# Patient Record
Sex: MALE | Race: WHITE | Employment: OTHER | URBAN - METROPOLITAN AREA
[De-identification: names, ages, dates, MRNs, and addresses within clinical notes are randomized per-mention and may not be internally consistent; named-entity substitution may affect disease eponyms.]

---

## 2017-03-10 ENCOUNTER — TELEPHONE (OUTPATIENT)
Dept: CARDIOLOGY CLINIC | Age: 67
End: 2017-03-10

## 2017-03-10 NOTE — TELEPHONE ENCOUNTER
Patient called regarding yearly Angio. He would like for someone to call him at [phone].  Thank you

## 2017-09-06 ENCOUNTER — CLINICAL SUPPORT (OUTPATIENT)
Dept: CARDIOLOGY CLINIC | Age: 67
End: 2017-09-06

## 2017-09-06 DIAGNOSIS — I71.40 ABDOMINAL AORTIC ANEURYSM (AAA) WITHOUT RUPTURE: Primary | ICD-10-CM

## 2017-09-06 NOTE — PROCEDURES
[facility]  *** FINAL REPORT ***    Name: Jean Claude Myers  MRN: [id number]       Outpatient  : 12 May 1950  HIS Order #: [id number]  [address] [facility] Visit #: [id number]  Date: 06 Sep 2017    TYPE OF TEST: Aorto-Iliac Duplex    REASON FOR TEST  Follow-up AAA    B-Mode:-                 (cm)   1     2     3  Aortic diameter:         AP:     1.7   3.2   3.1                           TV:     2.0   3.3   3.2  Common iliac diameter:   Right: 0.90                           Left:  1.00    Abdominal aortic aneuysm:-  Location:                Infrarenal  Type:  Distance from SMA (cm):    Duplex:-                           PSV  Stenosis                           ----- --------------------  Aorta: (1)                73.0 Normal         (2)                88.0         (3)                33.0    Right common iliac:      109.0 Normal  Right external iliac:    Left common iliac:         1.1 Normal  Left external iliac:    INTERPRETATION/FINDINGS  Noninvasive arterial examination of the aorta using duplex ultrasound. INTERPRETATION:    There appears to be two, tandem aneurysms within the infrarenal  aorta. The first is the largest measuring 3.2 x 3.3  centimeters in  transverse plane. The second region of dilation measures 3.1 x 3.1  centimeters. Atherosclerotic plaque is visualized throughout the  aorta without Doppler evidence of significant stenosis. The proximal  common iliac arteries appear free of aneurysmal disease. IMPRESSTION:   Small infrarenal abdominal aorta aneursym measuring 3.2 x 3.3  centimeters in its largest dimension. A second region of dilation is  noted in the distal aorta. It exhibits a maximum dimension of 3.1  centimeters. COMPARISION:    There has been no significant change since the previous exam dated  2015. ADDITIONAL COMMENTS    I have personally reviewed the data relevant to the interpretation of  this  study.     TECHNOLOGIST: PIERRE Sanders  Signed: 09/06/2017 11:42 AM    PHYSICIAN: Zac Mckeon MD  Signed: 09/08/2017 10:45 AM

## 2017-09-13 ENCOUNTER — TELEPHONE (OUTPATIENT)
Dept: CARDIOLOGY CLINIC | Age: 67
End: 2017-09-13

## 2018-08-28 RX ORDER — CHOLECALCIFEROL (VITAMIN D3) 125 MCG
100 CAPSULE ORAL DAILY
Status: ON HOLD | COMMUNITY
End: 2020-12-03

## 2018-08-28 RX ORDER — LOSARTAN POTASSIUM AND HYDROCHLOROTHIAZIDE 12.5; 5 MG/1; MG/1
1 TABLET ORAL DAILY
COMMUNITY

## 2018-08-28 RX ORDER — ROSUVASTATIN CALCIUM 10 MG/1
40 TABLET, COATED ORAL
COMMUNITY

## 2018-08-28 NOTE — PERIOP NOTES
[address] [facility] Preprocedure Instructions 1. On the day of your surgery, please report to registration located on the 2nd floor of the  [facility]. yes 2. You must have a responsible adult to drive you to the hospital, stay at the hospital during your procedure and drive you home. If they leave your procedure will not be started (no exceptions). yes 3. Do not have anything to eat or drink (including water, gum, mints, coffee, and juice) after midnight. This does not apply to the medications you were instructed to take by your physician. yesIf you are currently taking Plavix, Coumadin, Aspirin, or other blood-thinning agents, contact your physician for special instructions. not applicable, 
 
4. If you are having a procedure that requires bowel prep: We recommend that you have only clear liquids the day before your procedure and begin your bowel prep by 5:00 pm.  You may continue to drink clear liquids until midnight. If for any reason you are not able to complete your prep please notify your physician immediately. yes 5. Have a list of all current medications, including vitamins, herbal supplements and any other over the counter medications. yes 6. If you wear glasses, contacts, dentures and/or hearing aids, they may be removed prior to procedure, please bring a case to store them in. yes 7. You should understand that if you do not follow these instructions your procedure may be cancelled. If your physical condition changes (I.e. fever, cold or flu) please contact your doctor as soon as possible. 8. It is important that you be on time. If for any reason you are unable to keep your appointment please call )- the day of or your physicians office prior to your scheduled procedure

## 2018-08-28 NOTE — H&P
[address] Gopi Yang M.D. 
[phone] History and Physical    
 
NAME:  Jen Sizer  
:   1950 MRN:   [id number] Referring Physician:  Aaliyah Arnett MD 
 
 
Consult Date: 2018 3:32 PM 
 
Chief Complaint:  Ulcerative proctosigmoiditis History of Present Illness:  Patient is a 80 y.o. who is seen for UC surveillance. Denies any ongoing GI complaints. PMH: 
Past Medical History:  
Diagnosis Date  Abdominal aneurysm without mention of rupture   
 small infrarenal  
 Benign hypertensive heart disease without heart failure  CAD (coronary artery disease)   
 silent ischemia, 1v CAD, high grade RCA lesions s/p PCI multilink stents (3x23mm, 3x18mm) Devin Tyler  Celiac disease 2015  Dyslipidemia 10/29/2012  Insulin resistance 2013  Pure hypercholesterolemia  Ulcerative colitis ([facility] [address] Dr Sangita Ball PSH: 
Past Surgical History:  
Procedure Laterality Date  CARDIAC SURG PROCEDURE UNLIST    
 stents in arteries  HX CORONARY STENT PLACEMENT  2000  HX HERNIA REPAIR  2012  HX ORTHOPAEDIC Left   
 elbow tendon repair  STRESS TEST CARDIOLITE  11  
 7 min, normal perfusion, EF 73%  US AORTA B-SCAN LTD  3/2011  
 2 small infrarenal AAA, 3 cm each  US AORTA B-SCAN LTD  12  
 small infrarenal AAA, unchanged from one year ago Allergies: Allergies Allergen Reactions  Other Food Other (comments) Gluten  Adhesive Rash  Penicillins Hives  Sulfa (Sulfonamide Antibiotics) Hives Home Medications: 
Cannot display prior to admission medications because the patient has not been admitted in this contact. Hospital Medications: No current facility-administered medications for this encounter. Current Outpatient Prescriptions Medication Sig  
 rosuvastatin (CRESTOR) 10 mg tablet Take 10 mg by mouth nightly. Indications: hypercholesterolemia  losartan-hydroCHLOROthiazide (HYZAAR) 50-12.5 mg per tablet Take 1 Tab by mouth daily. Indications: hypertension  co-enzyme Q-10 (CO Q-10) 100 mg capsule Take 100 mg by mouth daily.  triamcinolone acetonide (KENALOG) 0.1 % topical cream Apply  to affected area as needed. use thin layer  KRILL OIL PO Take  by mouth.  cholecalciferol, vitamin D3, (VITAMIN D3) 2,000 unit Tab Take  by mouth.  multivitamin (ONE A DAY) tablet Take 1 Tab by mouth daily.  candesartan-hydrochlorothiazide (ATACAND HCT) 32-12.5 mg per tablet Take 1 Tab by mouth daily.  atenolol (TENORMIN) 50 mg tablet Take  by mouth daily.  amlodipine (NORVASC) 5 mg tablet Take 5 mg by mouth daily. Social History: 
Social History Substance Use Topics  Smoking status: Former Smoker Quit date: 80  Smokeless tobacco: Never Used  Alcohol use 4.2 oz/week 7 Shots of liquor per week Comment: social  
 
 
Family History: 
History reviewed. No pertinent family history. Review of Systems: 
 
 
Constitutional: negative fever, negative chills, negative weight loss Eyes:   negative visual changes ENT:   negative sore throat, tongue or lip swelling Respiratory:  negative cough, negative dyspnea Cards:  negative for chest pain, palpitations, lower extremity edema GI:   See HPI 
:  negative for frequency, dysuria Integument:  negative for rash and pruritus Heme:  negative for easy bruising and gum/nose bleeding Musculoskel: negative for myalgias,  back pain and muscle weakness Neuro: negative for headaches, dizziness, vertigo Psych:  negative for feelings of anxiety, depression Objective:  
No data found. EXAM:   
 NEURO-a&o HEENT-wnl LUNGS-clear COR-regular rate and rhythym ABD-soft , no tenderness, no rebound, good bs EXT-no edema Data Review No results for input(s): WBC, HGB, HCT, PLT, HGBEXT, HCTEXT, PLTEXT in the last 72 hours. No results for input(s): NA, K, CL, CO2, BUN, CREA, GLU, PHOS, CA in the last 72 hours. No results for input(s): SGOT, GPT, AP, TBIL, TP, ALB, GLOB, GGT, AML, LPSE in the last 72 hours. No lab exists for component: AMYP, HLPSE No results for input(s): INR, PTP, APTT in the last 72 hours. No lab exists for component: INREXT Patient Active Problem List  
Diagnosis Code  CAD (coronary artery disease) I25.10  Benign hypertensive heart disease without heart failure I11.9  Abdominal aortic aneurysm (HCC) I71.4  
 [facility] ([facility] K51.90  Dyslipidemia E78.5  Insulin resistance E88.81  Vitamin D deficiency E55.9  Celiac disease K90.0  Fatty liver K76.0 Assessment: · UC proctosigmoiditis Plan:  
· Colonoscopy today.   
 
Signed By: Betty Bingham MD   
 8/28/2018  3:32 PM

## 2018-08-29 ENCOUNTER — ANESTHESIA EVENT (OUTPATIENT)
Dept: ENDOSCOPY | Age: 68
End: 2018-08-29
Payer: MEDICARE

## 2018-08-29 ENCOUNTER — HOSPITAL ENCOUNTER (OUTPATIENT)
Age: 68
Setting detail: OUTPATIENT SURGERY
Discharge: HOME HEALTH | End: 2018-08-29
Attending: INTERNAL MEDICINE | Admitting: INTERNAL MEDICINE
Payer: MEDICARE

## 2018-08-29 ENCOUNTER — ANESTHESIA (OUTPATIENT)
Dept: ENDOSCOPY | Age: 68
End: 2018-08-29
Payer: MEDICARE

## 2018-08-29 VITALS
OXYGEN SATURATION: 98 % | RESPIRATION RATE: 25 BRPM | BODY MASS INDEX: 28.64 KG/M2 | WEIGHT: 189 LBS | SYSTOLIC BLOOD PRESSURE: 132 MMHG | TEMPERATURE: 98.1 F | HEART RATE: 94 BPM | DIASTOLIC BLOOD PRESSURE: 73 MMHG | HEIGHT: 68 IN

## 2018-08-29 PROCEDURE — 74011250636 HC RX REV CODE- 250/636

## 2018-08-29 PROCEDURE — 88305 TISSUE EXAM BY PATHOLOGIST: CPT | Performed by: INTERNAL MEDICINE

## 2018-08-29 PROCEDURE — 76040000019: Performed by: INTERNAL MEDICINE

## 2018-08-29 PROCEDURE — 77030009426 HC FCPS BIOP ENDOSC BSC -B: Performed by: INTERNAL MEDICINE

## 2018-08-29 PROCEDURE — 74011250636 HC RX REV CODE- 250/636: Performed by: INTERNAL MEDICINE

## 2018-08-29 PROCEDURE — 76060000031 HC ANESTHESIA FIRST 0.5 HR: Performed by: INTERNAL MEDICINE

## 2018-08-29 PROCEDURE — 77030013992 HC SNR POLYP ENDOSC BSC -B: Performed by: INTERNAL MEDICINE

## 2018-08-29 RX ORDER — MIDAZOLAM HYDROCHLORIDE 1 MG/ML
.25-5 INJECTION, SOLUTION INTRAMUSCULAR; INTRAVENOUS
Status: DISCONTINUED | OUTPATIENT
Start: 2018-08-29 | End: 2018-08-29 | Stop reason: HOSPADM

## 2018-08-29 RX ORDER — LIDOCAINE HYDROCHLORIDE 20 MG/ML
INJECTION, SOLUTION EPIDURAL; INFILTRATION; INTRACAUDAL; PERINEURAL AS NEEDED
Status: DISCONTINUED | OUTPATIENT
Start: 2018-08-29 | End: 2018-08-29 | Stop reason: HOSPADM

## 2018-08-29 RX ORDER — NALOXONE HYDROCHLORIDE 0.4 MG/ML
0.4 INJECTION, SOLUTION INTRAMUSCULAR; INTRAVENOUS; SUBCUTANEOUS
Status: DISCONTINUED | OUTPATIENT
Start: 2018-08-29 | End: 2018-08-29 | Stop reason: HOSPADM

## 2018-08-29 RX ORDER — SODIUM CHLORIDE 9 MG/ML
50 INJECTION, SOLUTION INTRAVENOUS CONTINUOUS
Status: DISCONTINUED | OUTPATIENT
Start: 2018-08-29 | End: 2018-08-29 | Stop reason: HOSPADM

## 2018-08-29 RX ORDER — MESALAMINE 0.38 G/1
1.5 CAPSULE, EXTENDED RELEASE ORAL DAILY
Qty: 360 CAP | Refills: 0 | Status: SHIPPED | OUTPATIENT
Start: 2018-08-29 | End: 2018-11-27

## 2018-08-29 RX ORDER — PROPOFOL 10 MG/ML
INJECTION, EMULSION INTRAVENOUS
Status: DISCONTINUED | OUTPATIENT
Start: 2018-08-29 | End: 2018-08-29 | Stop reason: HOSPADM

## 2018-08-29 RX ORDER — EPINEPHRINE 0.1 MG/ML
1 INJECTION INTRACARDIAC; INTRAVENOUS
Status: DISCONTINUED | OUTPATIENT
Start: 2018-08-29 | End: 2018-08-29 | Stop reason: HOSPADM

## 2018-08-29 RX ORDER — PROPOFOL 10 MG/ML
INJECTION, EMULSION INTRAVENOUS AS NEEDED
Status: DISCONTINUED | OUTPATIENT
Start: 2018-08-29 | End: 2018-08-29 | Stop reason: HOSPADM

## 2018-08-29 RX ORDER — FLUMAZENIL 0.1 MG/ML
0.2 INJECTION INTRAVENOUS
Status: DISCONTINUED | OUTPATIENT
Start: 2018-08-29 | End: 2018-08-29 | Stop reason: HOSPADM

## 2018-08-29 RX ORDER — DEXTROMETHORPHAN/PSEUDOEPHED 2.5-7.5/.8
1.2 DROPS ORAL
Status: DISCONTINUED | OUTPATIENT
Start: 2018-08-29 | End: 2018-08-29 | Stop reason: HOSPADM

## 2018-08-29 RX ORDER — ATROPINE SULFATE 0.1 MG/ML
0.4 INJECTION INTRAVENOUS
Status: DISCONTINUED | OUTPATIENT
Start: 2018-08-29 | End: 2018-08-29 | Stop reason: HOSPADM

## 2018-08-29 RX ORDER — ASPIRIN 81 MG/1
TABLET ORAL DAILY
COMMUNITY

## 2018-08-29 RX ADMIN — LIDOCAINE HYDROCHLORIDE 60 MG: 20 INJECTION, SOLUTION EPIDURAL; INFILTRATION; INTRACAUDAL; PERINEURAL at 12:47

## 2018-08-29 RX ADMIN — PROPOFOL 100 MCG/KG/MIN: 10 INJECTION, EMULSION INTRAVENOUS at 12:47

## 2018-08-29 RX ADMIN — SODIUM CHLORIDE 50 ML/HR: 900 INJECTION, SOLUTION INTRAVENOUS at 11:55

## 2018-08-29 RX ADMIN — PROPOFOL 100 MG: 10 INJECTION, EMULSION INTRAVENOUS at 12:47

## 2018-08-29 NOTE — ANESTHESIA POSTPROCEDURE EVALUATION
Post-Anesthesia Evaluation and Assessment Patient: Sue Alexis MRN: [id number]  SSN: [id number] YOB: 1950  Age: 80 y.o. Sex: male Cardiovascular Function/Vital Signs Visit Vitals  /73  Pulse 94  Temp 36.7 °C (98.1 °F)  Resp 25  
 Ht 5' 8\" (1.727 m)  Wt 85.7 kg (189 lb)  SpO2 98%  BMI 28.74 kg/m2 Patient is status post MAC anesthesia for Procedure(s): 
COLONOSCOPY 
ENDOSCOPIC POLYPECTOMY 
COLON BIOPSY. Nausea/Vomiting: None Postoperative hydration reviewed and adequate. Pain: 
Pain Scale 1: Numeric (0 - 10) (08/29/18 1340) Pain Intensity 1: 0 (08/29/18 1340) Managed Neurological Status: At baseline Mental Status and Level of Consciousness: Arousable Pulmonary Status:  
O2 Device: Room air (08/29/18 1340) Adequate oxygenation and airway patent Complications related to anesthesia: None Post-anesthesia assessment completed. No concerns Signed By: Gloria Nice MD   
 August 29, 2018

## 2018-08-29 NOTE — PERIOP NOTES
Procedure being performed under MAC; Robyn Arreaga CRNA at bedside monitoring patient at [phone]. See anesthesia notes. Endoscope was pre-cleaned at bedside immediately following procedure by Marcello Marino. GI Tech. at . Care of patient assumed from the anesthesia provider at [phone]. Patient tolerated procedure well. Abdomen remains soft and non tender post procedure, no complaints or indication of discomfort noted at this time. See anesthesia note. Patient transferred to Endoscopy Recovery and report given to recovery nurse Rasheed Camarillo recovery room RN.

## 2018-08-29 NOTE — ANESTHESIA PREPROCEDURE EVALUATION
Anesthetic History No history of anesthetic complications Review of Systems / Medical History Patient summary reviewed, nursing notes reviewed and pertinent labs reviewed Pulmonary Within defined limits Pertinent negatives: No recent URI and smoker Neuro/Psych Within defined limits Cardiovascular CAD and cardiac stents (2 stents in RCA in 2001) Exercise tolerance: >4 METS Comments: AAA Aortic duplex 5/12/2014 - tandem aneurysms 3.17 x 3.14 cm, 2.73 x 2.90 cm, unchanged from one year ago Exercise cardiolite 5/23/14 - 7 min, exercise induced SVT at 170, normal perfusion, EF 60% Per patient he had a recent stress test at OSH 1 month ago and was told it was fine GI/Hepatic/Renal 
  
 
 
 
Liver disease (fatty liver) Pertinent negatives: No GERD Comments: Ulcerative colitis Celiac dz Endo/Other Other Findings Comments: 7drinks. wk Physical Exam 
 
Airway Mallampati: II 
TM Distance: > 6 cm Neck ROM: normal range of motion Mouth opening: Normal 
 
 Cardiovascular Regular rate and rhythm,  S1 and S2 normal,  no murmur, click, rub, or gallop Rhythm: regular Rate: normal 
 
 
 
 Dental 
No notable dental hx Pulmonary Breath sounds clear to auscultation Abdominal 
GI exam deferred Other Findings Anesthetic Plan ASA: 3 Anesthesia type: MAC Induction: Intravenous Anesthetic plan and risks discussed with: Patient

## 2018-08-29 NOTE — ROUTINE PROCESS
[facility] 
1950 
[id number] Situation: 
Verbal report received from: Mohsen Preciado RN Procedure: Procedure(s): 
COLONOSCOPY 
ENDOSCOPIC POLYPECTOMY Background: 
 
Preoperative diagnosis: ULCERATIVE COLITIS Postoperative diagnosis: Transverse colon polyp Descending colon polyp Ulcerative colitis in sigmoid and rectum :  Dr. Garfield Skiff Assistant(s): Endoscopy Technician-1: Fredrik Homans Endoscopy RN-1: Mohsen Preciado RN Specimens:  
ID Type Source Tests Collected by Time Destination 1 : Right colon bxs. [id number] Crabtree MD 8/29/2018 1301 Pathology 2 : Transverse colon polyp Preservative   Jt Mckeon MD 8/29/2018 1301 Pathology 3 : Descending colon polyp Preservative   Jt Mckeon MD 8/29/2018 1302 Pathology 4 : Sigmoid Colon bxs. [id number] Crabtree MD 8/29/2018 5358 Pathology 5 : Rectal bxs. Jt Mckeon MD 8/29/2018 6565 Pathology H. Pylori  no Assessment: 
Intra-procedure medications Anesthesia gave intra-procedure sedation and medications, see anesthesia flow sheet yes Intravenous fluids: NS@ Orvel Baas Vital signs stable Abdominal assessment: round and soft Recommendation: 
Discharge patient per MD order. Family or Friend Permission to share finding with family or friend yes

## 2018-08-29 NOTE — PROCEDURES
Sofia Rivera M.D.  [phone]            2018          Colonoscopy Operative Report  Sue Alexis  :  1950  [place] Medical Record Number:  [id number]      Indications:  History of ulcerative proctitis     :  Diana Pineda MD    Referring Provider: Nanette Edmond MD    Sedation:  MAC anesthesia Propofol    Pre-Procedural Exam:      Airway: clear,  No airway problems anticipated  Heart: RRR, without gallops or rubs  Lungs: clear bilaterally without wheezes, crackles, or rhonchi  Abdomen: soft, nontender, nondistended, bowel sounds present  Mental Status: awake, alert and oriented to person, place and time     Procedure Details:  After informed consent was obtained with all risks and benefits of procedure explained and preoperative exam completed, the patient was taken to the endoscopy suite and placed in the left lateral decubitus position. Upon sequential sedation as per above, a digital rectal exam was performed. The Olympus videocolonoscope  was inserted in the rectum and carefully advanced to the terminal ileum. The quality of preparation was excellent. The colonoscope was slowly withdrawn with careful inspection and evaluation between folds. Retroflexion in the rectum was performed. Findings:   Terminal Ileum: normal  Cecum: normal  Ascending Colon: normal  Transverse Colon: 1  Sessile polyp(s), the largest 5 mm in size;   Descending Colon: 2  Sessile polyp(s), the largest 5 mm in size;  Sigmoid: moderate colitis consistent with ulcers and inflammation noted in the sigmoid colon starting at 30cms from the rectal verge  Rectum: colitis consistent with ulcers and inflammation noted in the rectum    Interventions:  biopsy of colon obtained from the right side and also from the sigmoid and rectum  3 complete polypectomy were performed using cold snare  and the polyps were  retrieved    Specimen Removed:  specimen #1, 5 mm in size, located in the transverse colon removed by cold snare and retrieved for pathology  #2, 5 mm in size, located in the descending colon removed by cold snare and retrieved for pathology  Biopsies from the right colon and also from the sigmoid colon and rectum    Complications: None. EBL:  None. Impression:  Moderately active ulcerative proctosigmoiditis noted starting at 30cms from the rectal verge. Biopsies were obtained for evaluation    Recommendations:  -Await pathology. -Repeat colonoscopy in 1 year   -High fiber diet.    -Resume normal medication(s). -Start Apriso 4 tabs daily to induce remission    Discharge Disposition:  Home in the company of a  when able to ambulate.     Gillian Beatty MD  8/29/2018  1:16 PM

## 2018-08-29 NOTE — IP AVS SNAPSHOT
[address] 
[phone] Patient: Elsie Gomez 
MRN: [id number] WLK:7/11/0817 About your hospitalization You were admitted on:  August 29, 2018 You last received care in the:  [facility] ENDOSCOPY You were discharged on:  August 29, 2018 Why you were hospitalized Your primary diagnosis was:  Not on File Follow-up Information None Discharge Orders None A check minnie indicates which time of day the medication should be taken. My Medications START taking these medications Instructions Each Dose to Equal  
 Morning Noon Evening Bedtime  
 mesalamine ER 0.375 gram 24 hour capsule Commonly known as:  APRISO Your last dose was: Your next dose is: Take 4 Caps by mouth daily for 90 days. 1.5 g  
    
   
   
   
  
  
CONTINUE taking these medications Instructions Each Dose to Equal  
 Morning Noon Evening Bedtime  
 aspirin delayed-release 81 mg tablet Your last dose was: Your next dose is: Take  by mouth daily. atenolol 50 mg tablet Commonly known as:  TENORMIN Your last dose was: Your next dose is: Take  by mouth daily. CO Q-10 100 mg capsule Generic drug:  co-enzyme Q-10 Your last dose was: Your next dose is: Take 100 mg by mouth daily. 100 mg KRILL OIL PO Your last dose was: Your next dose is: Take  by mouth.  
     
   
   
   
  
 losartan-hydroCHLOROthiazide 50-12.5 mg per tablet Commonly known as:  HYZAAR Your last dose was: Your next dose is: Take 1 Tab by mouth daily. Indications: hypertension 1 Tab  
    
   
   
   
  
 multivitamin tablet Commonly known as:  ONE A DAY Your last dose was: Your next dose is: Take 1 Tab by mouth daily. 1 Tab NORVASC 5 mg tablet Generic drug:  amLODIPine Your last dose was: Your next dose is: Take 5 mg by mouth daily. 5 mg  
    
   
   
   
  
 rosuvastatin 10 mg tablet Commonly known as:  CRESTOR Your last dose was: Your next dose is: Take 10 mg by mouth nightly. Indications: hypercholesterolemia 10 mg  
    
   
   
   
  
 triamcinolone acetonide 0.1 % topical cream  
Commonly known as:  KENALOG Your last dose was: Your next dose is:    
   
   
 Apply  to affected area as needed. use thin layer VITAMIN D3 2,000 unit Tab Generic drug:  cholecalciferol (vitamin D3) Your last dose was: Your next dose is: Take  by mouth. Where to Get Your Medications Information on where to get these meds will be given to you by the nurse or doctor. ! Ask your nurse or doctor about these medications  
  mesalamine ER 0.375 gram 24 hour capsule Discharge Instructions [facility]. [facility] Radha Preciado M.D. 
[phone] COLON DISCHARGE INSTRUCTIONS 
    
2018 Liza Lombard 
:  1950 [place] Medical Record Number:  [id number] COLONOSCOPY FINDINGS: 
Your colonoscopy showed: ulcerative proctosigmoiditis. DISCOMFORT: 
Redness at IV site- apply warm compress to area; if redness or soreness persist- contact your physician There may be a slight amount of blood passed from the rectum Gaseous discomfort- walking, belching will help relieve any discomfort You may not operate a vehicle for 12 hours You may not engage in an occupation involving machinery or appliances for rest of today You may not drink alcoholic beverages for at least 12 hours Avoid making any critical decisions for at least 24 hour DIET: 
 High fiber diet.  however -  remember your colon is empty and a heavy meal will produce gas. Avoid these foods:  vegetables, fried / greasy foods, carbonated drinks for today ACTIVITY: 
You may resume your normal daily activities it is recommended that you spend the remainder of the day resting -  avoid any strenuous activity. CALL M.D. ANY SIGN OF: Increasing pain, nausea, vomiting Abdominal distension (swelling) New increased bleeding (oral or rectal) Fever (chills) Pain in chest area Bloody discharge from nose or mouth Shortness of breath Follow-up Instructions: 
 Call Dr. Felicia Mike if any questions or problems. Telephone # [phone] Biopsy results will be available in  5 to 7 days Should have a repeat colonoscopy in 1 years. Introducing Richard Munoz As a [facility] patient, I wanted to make you aware of our electronic visit tool called Richard Munoz. [facility] [facility] 24/7 allows you to connect within minutes with a medical provider 24 hours a day, seven days a week via a mobile device or tablet or logging into a secure website from your computer. You can access Richard Nataliomichaelfin from anywhere in the [place]. A virtual visit might be right for you when you have a simple condition and feel like you just dont want to get out of bed, or cant get away from work for an appointment, when your regular [facility] provider is not available (evenings, weekends or holidays), or when youre out of town and need minor care. Electronic visits cost only $49 and if the [email or web address] provider determines a prescription is needed to treat your condition, one can be electronically transmitted to a nearby pharmacy*. Please take a moment to enroll today if you have not already done so. The enrollment process is free and takes just a few minutes.   To enroll, please download the [employer] [facility] 24/7 yelena to your tablet or phone, or visit [email or web address] to enroll on your computer. And, as an [facility] patient with a [employer] account, the results of your visits will be scanned into your electronic medical record and your primary care provider will be able to view the scanned results. We urge you to continue to see your regular [facility] provider for your ongoing medical care. And while your primary care provider may not be the one available when you seek a [employer] virtual visit, the peace of mind you get from getting a real diagnosis real time can be priceless. For more information on [employer]), view our Frequently Asked Questions (FAQs) at [email or web address]. Sincerely, 
 
Marco Ledezma MD 
Chief Medical Officer [facility] Amber Hernández *:  certain medications cannot be prescribed via [employer] Providers Seen During Your Hospitalization Provider Specialty Primary office phone Bharati Beatty MD Gastroenterology [phone] Your Primary Care Physician (PCP) Primary Care Physician Office Phone Office Fax Phong aLyne  You are allergic to the following Allergen Reactions Other Food Other (comments) Gluten Adhesive Rash Penicillins Hives Sulfa (Sulfonamide Antibiotics) Hives Recent Documentation Height Weight BMI Smoking Status 1.727 m 85.7 kg 28.74 kg/m2 Former Smoker Emergency Contacts Name Discharge Info Relation Home Work Mobile [address] CAREGIVER [3] Spouse [3] [phone] Patient Belongings The following personal items are in your possession at time of discharge: 
  Dental Appliances: None  Visual Aid: Glasses, With patient Please provide this summary of care documentation to your next provider. Signatures-by signing, you are acknowledging that this After Visit Summary has been reviewed with you and you have received a copy. Patient Signature:  ____________________________________________________________ Date:  ____________________________________________________________  
  
Jacqlyn Newcomer Provider Signature:  ____________________________________________________________ Date:  ____________________________________________________________

## 2020-11-29 ENCOUNTER — HOSPITAL ENCOUNTER (OUTPATIENT)
Dept: LAB | Age: 70
Discharge: HOME | End: 2020-11-29
Payer: MEDICARE

## 2020-11-29 ENCOUNTER — TRANSCRIBE ORDER (OUTPATIENT)
Dept: EMERGENCY DEPT | Age: 70
End: 2020-11-29

## 2020-11-29 DIAGNOSIS — Z01.812 PRE-PROCEDURAL LABORATORY EXAMINATIONS: Primary | ICD-10-CM

## 2020-11-29 DIAGNOSIS — Z01.812 PRE-PROCEDURAL LABORATORY EXAMINATIONS: ICD-10-CM

## 2020-11-29 PROCEDURE — 87635 SARS-COV-2 COVID-19 AMP PRB: CPT

## 2020-11-30 LAB — SARS-COV-2, COV2NT: NOT DETECTED

## 2020-11-30 RX ORDER — UREA 10 %
500 LOTION (ML) TOPICAL DAILY
COMMUNITY

## 2020-11-30 NOTE — PERIOP NOTES
[address] [place] [address] Dr Spears Preprocedure Instructions      1. On the day of your surgery, please report to registration located on the 2nd floor of the  [facility]. yes    2. You must have a responsible adult to drive you to the hospital, stay at the hospital during your procedure and drive you home. If they leave your procedure will not be started (no exceptions). yes    3. Do not have anything to eat or drink (including water, gum, mints, coffee, and juice) after midnight. This does not apply to the medications you were instructed to take by your physician. yesIf you are currently taking Plavix, Coumadin, Aspirin, or other blood-thinning agents, contact your physician for special instructions. Stop taking asa    4. If you are having a procedure that requires bowel prep: We recommend that you have only clear liquids the day before your procedure and begin your bowel prep by 5:00 pm.  You may continue to drink clear liquids until midnight. If for any reason you are not able to complete your prep please notify your physician immediately. yes    5. Have a list of all current medications, including vitamins, herbal supplements and any other over the counter medications. yes    6. If you wear glasses, contacts, dentures and/or hearing aids, they may be removed prior to procedure, please bring a case to store them in. yes    7. You should understand that if you do not follow these instructions your procedure may be cancelled. If your physical condition changes (I.e. fever, cold or flu) please contact your doctor as soon as possible. 8. It is important that you be on time.   If for any reason you are unable to keep your appointment please call )- the day of or your physicians office prior to your scheduled procedure

## 2020-12-03 ENCOUNTER — ANESTHESIA EVENT (OUTPATIENT)
Dept: ENDOSCOPY | Age: 70
End: 2020-12-03
Payer: MEDICARE

## 2020-12-03 ENCOUNTER — ANESTHESIA (OUTPATIENT)
Dept: ENDOSCOPY | Age: 70
End: 2020-12-03
Payer: MEDICARE

## 2020-12-03 ENCOUNTER — HOSPITAL ENCOUNTER (OUTPATIENT)
Age: 70
Setting detail: OUTPATIENT SURGERY
Discharge: HOME | End: 2020-12-03
Attending: INTERNAL MEDICINE | Admitting: INTERNAL MEDICINE
Payer: MEDICARE

## 2020-12-03 VITALS
HEIGHT: 68 IN | RESPIRATION RATE: 18 BRPM | TEMPERATURE: 97.9 F | BODY MASS INDEX: 29.57 KG/M2 | SYSTOLIC BLOOD PRESSURE: 123 MMHG | WEIGHT: 195.11 LBS | OXYGEN SATURATION: 96 % | HEART RATE: 83 BPM | DIASTOLIC BLOOD PRESSURE: 70 MMHG

## 2020-12-03 LAB
GLUCOSE BLD STRIP.AUTO-MCNC: 117 MG/DL (ref 65–100)
SERVICE CMNT-IMP: ABNORMAL

## 2020-12-03 PROCEDURE — 82962 GLUCOSE BLOOD TEST: CPT

## 2020-12-03 PROCEDURE — 76060000031 HC ANESTHESIA FIRST 0.5 HR: Performed by: INTERNAL MEDICINE

## 2020-12-03 PROCEDURE — 77030021593 HC FCPS BIOP ENDOSC BSC -A: Performed by: INTERNAL MEDICINE

## 2020-12-03 PROCEDURE — 74011250637 HC RX REV CODE- 250/637: Performed by: INTERNAL MEDICINE

## 2020-12-03 PROCEDURE — 74011250636 HC RX REV CODE- 250/636: Performed by: NURSE ANESTHETIST, CERTIFIED REGISTERED

## 2020-12-03 PROCEDURE — 76040000019: Performed by: INTERNAL MEDICINE

## 2020-12-03 PROCEDURE — 2709999900 HC NON-CHARGEABLE SUPPLY: Performed by: INTERNAL MEDICINE

## 2020-12-03 PROCEDURE — 74011000250 HC RX REV CODE- 250: Performed by: NURSE ANESTHETIST, CERTIFIED REGISTERED

## 2020-12-03 PROCEDURE — 88305 TISSUE EXAM BY PATHOLOGIST: CPT

## 2020-12-03 PROCEDURE — 74011250636 HC RX REV CODE- 250/636: Performed by: INTERNAL MEDICINE

## 2020-12-03 RX ORDER — NALOXONE HYDROCHLORIDE 0.4 MG/ML
0.4 INJECTION, SOLUTION INTRAMUSCULAR; INTRAVENOUS; SUBCUTANEOUS
Status: DISCONTINUED | OUTPATIENT
Start: 2020-12-03 | End: 2020-12-03 | Stop reason: HOSPADM

## 2020-12-03 RX ORDER — PROPOFOL 10 MG/ML
INJECTION, EMULSION INTRAVENOUS AS NEEDED
Status: DISCONTINUED | OUTPATIENT
Start: 2020-12-03 | End: 2020-12-03 | Stop reason: HOSPADM

## 2020-12-03 RX ORDER — EPINEPHRINE 0.1 MG/ML
1 INJECTION INTRACARDIAC; INTRAVENOUS
Status: DISCONTINUED | OUTPATIENT
Start: 2020-12-03 | End: 2020-12-03 | Stop reason: HOSPADM

## 2020-12-03 RX ORDER — MIDAZOLAM HYDROCHLORIDE 1 MG/ML
.25-5 INJECTION, SOLUTION INTRAMUSCULAR; INTRAVENOUS
Status: DISCONTINUED | OUTPATIENT
Start: 2020-12-03 | End: 2020-12-03 | Stop reason: HOSPADM

## 2020-12-03 RX ORDER — ATROPINE SULFATE 0.1 MG/ML
0.4 INJECTION INTRAVENOUS
Status: DISCONTINUED | OUTPATIENT
Start: 2020-12-03 | End: 2020-12-03 | Stop reason: HOSPADM

## 2020-12-03 RX ORDER — LIDOCAINE HYDROCHLORIDE 20 MG/ML
INJECTION, SOLUTION EPIDURAL; INFILTRATION; INTRACAUDAL; PERINEURAL AS NEEDED
Status: DISCONTINUED | OUTPATIENT
Start: 2020-12-03 | End: 2020-12-03 | Stop reason: HOSPADM

## 2020-12-03 RX ORDER — PROPOFOL 10 MG/ML
INJECTION, EMULSION INTRAVENOUS
Status: DISCONTINUED | OUTPATIENT
Start: 2020-12-03 | End: 2020-12-03 | Stop reason: HOSPADM

## 2020-12-03 RX ORDER — SODIUM CHLORIDE 9 MG/ML
50 INJECTION, SOLUTION INTRAVENOUS CONTINUOUS
Status: DISCONTINUED | OUTPATIENT
Start: 2020-12-03 | End: 2020-12-03 | Stop reason: HOSPADM

## 2020-12-03 RX ORDER — DEXTROMETHORPHAN/PSEUDOEPHED 2.5-7.5/.8
1.2 DROPS ORAL
Status: DISCONTINUED | OUTPATIENT
Start: 2020-12-03 | End: 2020-12-03 | Stop reason: HOSPADM

## 2020-12-03 RX ORDER — FLUMAZENIL 0.1 MG/ML
0.2 INJECTION INTRAVENOUS
Status: DISCONTINUED | OUTPATIENT
Start: 2020-12-03 | End: 2020-12-03 | Stop reason: HOSPADM

## 2020-12-03 RX ADMIN — PROPOFOL 140 MCG/KG/MIN: 10 INJECTION, EMULSION INTRAVENOUS at 08:06

## 2020-12-03 RX ADMIN — LIDOCAINE HYDROCHLORIDE 40 MG: 20 INJECTION, SOLUTION INTRAVENOUS at 08:05

## 2020-12-03 RX ADMIN — SODIUM CHLORIDE 100 MCG: 9 INJECTION INTRAMUSCULAR; INTRAVENOUS; SUBCUTANEOUS at 08:23

## 2020-12-03 RX ADMIN — PROPOFOL 70 MG: 10 INJECTION, EMULSION INTRAVENOUS at 08:06

## 2020-12-03 NOTE — ANESTHESIA POSTPROCEDURE EVALUATION
Procedure(s):  COLONOSCOPY  COLON BIOPSY. MAC    Anesthesia Post Evaluation        Patient location during evaluation: PACU  Level of consciousness: awake  Pain management: adequate  Airway patency: patent  Anesthetic complications: no  Cardiovascular status: acceptable  Respiratory status: acceptable  Hydration status: acceptable  Post anesthesia nausea and vomiting:  none      INITIAL Post-op Vital signs:   Vitals Value Taken Time   /61 12/3/2020  8:33 AM   Temp 36.6 °C (97.9 °F) 12/3/2020  8:29 AM   Pulse 97 12/3/2020  8:36 AM   Resp 21 12/3/2020  8:36 AM   SpO2 96 % 12/3/2020  8:36 AM   Vitals shown include unvalidated device data.

## 2020-12-03 NOTE — H&P
Danielle Shannon M.D.  [phone]            History and Physical       NAME:  Earl Bolton   :   1950   MRN:   [id number]       Referring Physician:  Dr. Karen Castillo Date: 12/3/2020 8:07 AM    Chief Complaint:  Colon cancer screening and history of ulcerative colitis    History of Present Illness:  Patient is a 72 y.o. who is seen for colon cancer screening and ulcerative colitis surveillance. Denies any ongoing GI complaints. PMH:  Past Medical History:   Diagnosis Date    Abdominal aneurysm without mention of rupture     small infrarenal    Benign hypertensive heart disease without heart failure     CAD (coronary artery disease)     silent ischemia, 1v CAD, high grade RCA lesions s/p PCI multilink stents (3x23mm, 3x18mm) [place]    Celiac disease 2015    Dyslipidemia 10/29/2012    Hernia, inguinal     Insulin resistance 2013    Pure hypercholesterolemia     Ulcerative colitis ([facility] [address]     Dr Danie Nava       PSH:  Past Surgical History:   Procedure Laterality Date    CARDIAC SURG PROCEDURE UNLIST      stents in arteries    COLONOSCOPY N/A 2018    COLONOSCOPY performed by Mary Charles MD at [address] [place] [address] HX CORONARY STENT PLACEMENT  2000    HX HEMORRHOIDECTOMY      HX HERNIA REPAIR  11/2012    x2    HX ORTHOPAEDIC Left 1985    elbow tendon repair    HX OTHER SURGICAL Right 2020    catract, vitrectomy    STRESS TEST CARDIOLITE  11    7 min, normal perfusion, EF 73%    US AORTA B-SCAN LTD  3/2011    2 small infrarenal AAA, 3 cm each    US AORTA B-SCAN LTD  12    small infrarenal AAA, unchanged from one year ago       Allergies: Allergies   Allergen Reactions    Other Food Other (comments)     Gluten    Adhesive Rash    Penicillins Hives    Sulfa (Sulfonamide Antibiotics) Hives       Home Medications:  Prior to Admission Medications   Prescriptions Last Dose Informant Patient Reported? Taking? amlodipine (NORVASC) 5 mg tablet 2020 at Unknown time  Yes Yes   Sig: Take 5 mg by mouth daily. aspirin delayed-release 81 mg tablet 2020 at Unknown time  Yes No   Sig: Take  by mouth daily. atenolol (TENORMIN) 50 mg tablet 2020 at Unknown time  Yes Yes   Sig: Take  by mouth daily. cholecalciferol, vitamin D3, (VITAMIN D3) 2,000 unit Tab 2020 at Unknown time  Yes Yes   Sig: Take  by mouth.   cyanocobalamin (Vitamin B-12) 100 mcg tablet 2020 at Unknown time  Yes Yes   Sig: Take 500 mcg by mouth daily. losartan-hydroCHLOROthiazide (HYZAAR) 50-12.5 mg per tablet 2020 at Unknown time  Yes Yes   Sig: Take 1 Tab by mouth daily. Indications: hypertension   rosuvastatin (CRESTOR) 10 mg tablet 2020 at Unknown time  Yes Yes   Sig: Take 40 mg by mouth nightly.  Indications: high cholesterol      Facility-Administered Medications: None       Hospital Medications:  Current Facility-Administered Medications   Medication Dose Route Frequency    0.9% sodium chloride infusion  50 mL/hr IntraVENous CONTINUOUS    midazolam (VERSED) injection 0.25-5 mg  0.25-5 mg IntraVENous Multiple    naloxone (NARCAN) injection 0.4 mg  0.4 mg IntraVENous Multiple    flumazeniL (ROMAZICON) 0.1 mg/mL injection 0.2 mg  0.2 mg IntraVENous Multiple    simethicone (MYLICON) 05TQ/4.9YB oral drops 80 mg  1.2 mL Oral Multiple    atropine injection 0.4 mg  0.4 mg IntraVENous ONCE PRN    EPINEPHrine (ADRENALIN) 0.1 mg/mL syringe 1 mg  1 mg Endoscopically ONCE PRN     Facility-Administered Medications Ordered in Other Encounters   Medication Dose Route Frequency    propofoL (DIPRIVAN) 10 mg/mL injection   IntraVENous PRN    propofoL (DIPRIVAN) 10 mg/mL injection   IntraVENous CONTINUOUS    lidocaine (PF) (XYLOCAINE) 20 mg/mL (2 %) injection   IntraVENous PRN       Social History:  Social History     Tobacco Use    Smoking status: Former Smoker     Last attempt to quit:      Years since quittin.9  Smokeless tobacco: Never Used   Substance Use Topics    Alcohol use: Yes     Alcohol/week: 7.0 standard drinks     Types: 7 Shots of liquor per week     Comment: social       Family History:  History reviewed. No pertinent family history. Review of Systems:      Constitutional: negative fever, negative chills, negative weight loss  Eyes:   negative visual changes  ENT:   negative sore throat, tongue or lip swelling  Respiratory:  negative cough, negative dyspnea  Cards:  negative for chest pain, palpitations, lower extremity edema  GI:   See HPI  :  negative for frequency, dysuria  Integument:  negative for rash and pruritus  Heme:  negative for easy bruising and gum/nose bleeding  Musculoskel: negative for myalgias,  back pain and muscle weakness  Neuro: negative for headaches, dizziness, vertigo  Psych:  negative for feelings of anxiety, depression       Objective:     Patient Vitals for the past 8 hrs:   BP Temp Pulse Resp SpO2 Height Weight   12/03/20 0648  98.4 °F (36.9 °C)        12/03/20 0641 132/85  (!) 129 13 97 % 5' 8\" (1.727 m) 88.5 kg (195 lb 1.7 oz)     No intake/output data recorded. No intake/output data recorded. EXAM:     NEURO-a&o   HEENT-wnl   LUNGS-clear    COR-regular rate and rhythym     ABD-soft , no tenderness, no rebound, good bs     EXT-no edema     Data Review     No results for input(s): WBC, HGB, HCT, PLT, HGBEXT, HCTEXT, PLTEXT in the last 72 hours. No results for input(s): NA, K, CL, CO2, BUN, CREA, GLU, PHOS, CA in the last 72 hours. No results for input(s): AP, TBIL, TP, ALB, GLOB, GGT, AML, LPSE in the last 72 hours. No lab exists for component: SGOT, GPT, AMYP, HLPSE  No results for input(s): INR, PTP, APTT, INREXT in the last 72 hours.     Patient Active Problem List   Diagnosis Code    CAD (coronary artery disease) I25.10    Benign hypertensive heart disease without heart failure I11.9    Abdominal aortic aneurysm ([facility] [address] I71.4    Ulcerative colitis ([facility] [address] K51.90    Dyslipidemia E78.5    Insulin resistance E88.81    Vitamin D deficiency E55.9    Celiac disease K90.0    Fatty liver K76.0      Assessment:   · Colon cancer screening and ulcerative colitis surveillance   Plan:   · Colonoscopy today.      Signed By: Jagdeep Black MD     12/3/2020  8:07 AM

## 2020-12-03 NOTE — PERIOP NOTES
Report from Anthony Field CRNA, see anesthesia record. ABD remains soft and non-tender post procedure. Pt has no complaints at this time and tolerated the procedure well. Endoscope was pre-cleaned at bedside immediately following procedure by Antisha.

## 2020-12-03 NOTE — DISCHARGE INSTRUCTIONS
[facility]. Tanika Martinez M.D.  [phone]            COLON DISCHARGE INSTRUCTIONS       12/3/2020    Yolanda Cordoba  :  1950  [facility] Medical Record Number:  [id number]      COLONOSCOPY FINDINGS:  Your colonoscopy showed well healed mucosa, mild diverticulosis and small internal hemorrhoids. DISCOMFORT:  Redness at IV site- apply warm compress to area; if redness or soreness persist- contact your physician  There may be a slight amount of blood passed from the rectum  Gaseous discomfort- walking, belching will help relieve any discomfort  You may not operate a vehicle for 12 hours  You may not engage in an occupation involving machinery or appliances for rest of today  You may not drink alcoholic beverages for at least 12 hours  Avoid making any critical decisions for at least 24 hour  DIET:   High fiber gluten free diet. - however -  remember your colon is empty and a heavy meal will produce gas. Avoid these foods:  vegetables, fried / greasy foods, carbonated drinks for today     ACTIVITY:  You may resume your normal daily activities it is recommended that you spend the remainder of the day resting -  avoid any strenuous activity. CALL M.D. ANY SIGN OF:   Increasing pain, nausea, vomiting  Abdominal distension (swelling)  New increased bleeding (oral or rectal)  Fever (chills)  Pain in chest area  Bloody discharge from nose or mouth   Shortness of breath    Follow-up Instructions:   Call Dr. Mauricio Iniguez if any questions or problems. Telephone # [phone]  Biopsy results will be available in  5 to 7 days  Should have a repeat colonoscopy in 5 years.

## 2020-12-03 NOTE — PROGRESS NOTES
Corbyno Drummond  1950  [id number]    Situation:  Verbal report received from: Lina WHITMORE   Procedure: Procedure(s):  COLONOSCOPY  COLON BIOPSY    Background:    Preoperative diagnosis: PERSONAL HX OF POLUPS  Postoperative diagnosis: Diverticulosis  Hx of Ulcerative Colitis    :  Dr. Cami Gaspar  Assistant(s): Endoscopy Technician-1: Jennifer Stokes  Endoscopy RN-1: Maria R Ulloa RN    Specimens:   ID Type Source Tests Collected by Time Destination   1 : Right Colon Biopsies Preservative   Candice Mathews MD 12/3/2020 0815 Pathology   2 : Left Colon Biopsies Preservative   Candice Mathews MD 12/3/2020 1283 Pathology   3 : Rectum Biopsies Preservative   Candice Mathews MD 12/3/2020 0820 Pathology     H. Pylori  no    Assessment:    Anesthesia gave intra-procedure sedation and medications, see anesthesia flow sheet yes    Intravenous fluids: NS@ KVO     Vital signs stable     Abdominal assessment: round and soft     Recommendation:  Discharge patient per MD order.   Return to floor  Family or Friend   Permission to share finding with family or friend yes

## 2020-12-03 NOTE — ANESTHESIA PREPROCEDURE EVALUATION
Anesthetic History   No history of anesthetic complications            Review of Systems / Medical History  Patient summary reviewed, nursing notes reviewed and pertinent labs reviewed    Pulmonary  Within defined limits                 Neuro/Psych   Within defined limits           Cardiovascular    Hypertension          CAD, cardiac stents (2 stents in RCA in 2001) and hyperlipidemia    Exercise tolerance: >4 METS  Comments: AAA  Aortic duplex 5/12/2014 - tandem aneurysms 3.17 x 3.14 cm, 2.73 x 2.90 cm, unchanged from one year ago  Exercise cardiolite 5/23/14 - 7 min, exercise induced SVT at 170, normal perfusion, EF 60%     GI/Hepatic/Renal           Liver disease (fatty liver)  Pertinent negatives: No GERD  Comments: Ulcerative colitis  Celiac dz Endo/Other             Other Findings            Physical Exam    Airway  Mallampati: II  TM Distance: > 6 cm  Neck ROM: normal range of motion   Mouth opening: Normal     Cardiovascular  Regular rate and rhythm,  S1 and S2 normal,  no murmur, click, rub, or gallop  Rhythm: regular  Rate: normal         Dental  No notable dental hx       Pulmonary  Breath sounds clear to auscultation               Abdominal  GI exam deferred       Other Findings            Anesthetic Plan    ASA: 3  Anesthesia type: MAC          Induction: Intravenous  Anesthetic plan and risks discussed with: Patient

## 2020-12-03 NOTE — PROGRESS NOTES
Endoscopy discharge instructions have been reviewed and given to patient. The patient verbalized understanding and acceptance of instructions. Dr. Jeff Plascencia discussed with patient procedure findings and next steps.

## 2021-08-26 ENCOUNTER — ANESTHESIA EVENT (OUTPATIENT)
Dept: ENDOSCOPY | Age: 71
End: 2021-08-26
Payer: MEDICARE

## 2021-08-26 ENCOUNTER — ANESTHESIA (OUTPATIENT)
Dept: ENDOSCOPY | Age: 71
End: 2021-08-26
Payer: MEDICARE

## 2021-08-26 ENCOUNTER — HOSPITAL ENCOUNTER (OUTPATIENT)
Age: 71
Setting detail: OUTPATIENT SURGERY
Discharge: HOME | End: 2021-08-26
Attending: INTERNAL MEDICINE | Admitting: INTERNAL MEDICINE
Payer: MEDICARE

## 2021-08-26 VITALS
SYSTOLIC BLOOD PRESSURE: 109 MMHG | WEIGHT: 186.29 LBS | DIASTOLIC BLOOD PRESSURE: 63 MMHG | HEIGHT: 67 IN | TEMPERATURE: 98 F | HEART RATE: 60 BPM | BODY MASS INDEX: 29.24 KG/M2 | OXYGEN SATURATION: 96 % | RESPIRATION RATE: 14 BRPM

## 2021-08-26 PROCEDURE — 2709999900 HC NON-CHARGEABLE SUPPLY: Performed by: INTERNAL MEDICINE

## 2021-08-26 PROCEDURE — 77030021593 HC FCPS BIOP ENDOSC BSC -A: Performed by: INTERNAL MEDICINE

## 2021-08-26 PROCEDURE — 74011250636 HC RX REV CODE- 250/636: Performed by: NURSE ANESTHETIST, CERTIFIED REGISTERED

## 2021-08-26 PROCEDURE — 88305 TISSUE EXAM BY PATHOLOGIST: CPT

## 2021-08-26 PROCEDURE — 76060000031 HC ANESTHESIA FIRST 0.5 HR: Performed by: INTERNAL MEDICINE

## 2021-08-26 PROCEDURE — C1726 CATH, BAL DIL, NON-VASCULAR: HCPCS | Performed by: INTERNAL MEDICINE

## 2021-08-26 PROCEDURE — 77030018712 HC DEV BLLN INFL BSC -B: Performed by: INTERNAL MEDICINE

## 2021-08-26 PROCEDURE — 76040000019: Performed by: INTERNAL MEDICINE

## 2021-08-26 RX ORDER — SODIUM CHLORIDE 9 MG/ML
INJECTION, SOLUTION INTRAVENOUS
Status: DISCONTINUED | OUTPATIENT
Start: 2021-08-26 | End: 2021-08-26 | Stop reason: HOSPADM

## 2021-08-26 RX ORDER — PROPOFOL 10 MG/ML
INJECTION, EMULSION INTRAVENOUS AS NEEDED
Status: DISCONTINUED | OUTPATIENT
Start: 2021-08-26 | End: 2021-08-26 | Stop reason: HOSPADM

## 2021-08-26 RX ORDER — SODIUM CHLORIDE 9 MG/ML
50 INJECTION, SOLUTION INTRAVENOUS CONTINUOUS
Status: DISCONTINUED | OUTPATIENT
Start: 2021-08-26 | End: 2021-08-26 | Stop reason: HOSPADM

## 2021-08-26 RX ORDER — FLUMAZENIL 0.1 MG/ML
0.2 INJECTION INTRAVENOUS
Status: DISCONTINUED | OUTPATIENT
Start: 2021-08-26 | End: 2021-08-26 | Stop reason: HOSPADM

## 2021-08-26 RX ORDER — ATROPINE SULFATE 0.1 MG/ML
0.4 INJECTION INTRAVENOUS
Status: DISCONTINUED | OUTPATIENT
Start: 2021-08-26 | End: 2021-08-26 | Stop reason: HOSPADM

## 2021-08-26 RX ORDER — PROPOFOL 10 MG/ML
INJECTION, EMULSION INTRAVENOUS
Status: DISCONTINUED | OUTPATIENT
Start: 2021-08-26 | End: 2021-08-26 | Stop reason: HOSPADM

## 2021-08-26 RX ORDER — MIDAZOLAM HYDROCHLORIDE 1 MG/ML
.25-5 INJECTION, SOLUTION INTRAMUSCULAR; INTRAVENOUS
Status: DISCONTINUED | OUTPATIENT
Start: 2021-08-26 | End: 2021-08-26 | Stop reason: HOSPADM

## 2021-08-26 RX ORDER — EPINEPHRINE 0.1 MG/ML
1 INJECTION INTRACARDIAC; INTRAVENOUS
Status: DISCONTINUED | OUTPATIENT
Start: 2021-08-26 | End: 2021-08-26 | Stop reason: HOSPADM

## 2021-08-26 RX ORDER — DEXTROMETHORPHAN/PSEUDOEPHED 2.5-7.5/.8
1.2 DROPS ORAL
Status: DISCONTINUED | OUTPATIENT
Start: 2021-08-26 | End: 2021-08-26 | Stop reason: HOSPADM

## 2021-08-26 RX ORDER — NALOXONE HYDROCHLORIDE 0.4 MG/ML
0.4 INJECTION, SOLUTION INTRAMUSCULAR; INTRAVENOUS; SUBCUTANEOUS
Status: DISCONTINUED | OUTPATIENT
Start: 2021-08-26 | End: 2021-08-26 | Stop reason: HOSPADM

## 2021-08-26 RX ORDER — MESALAMINE 500 MG/1
500 CAPSULE, EXTENDED RELEASE ORAL 2 TIMES DAILY
COMMUNITY

## 2021-08-26 RX ADMIN — PROPOFOL INJECTABLE EMULSION 50 MG: 10 INJECTION, EMULSION INTRAVENOUS at 12:06

## 2021-08-26 RX ADMIN — PROPOFOL INJECTABLE EMULSION 25 MG: 10 INJECTION, EMULSION INTRAVENOUS at 12:03

## 2021-08-26 RX ADMIN — PROPOFOL INJECTABLE EMULSION 75 MG: 10 INJECTION, EMULSION INTRAVENOUS at 12:02

## 2021-08-26 RX ADMIN — PROPOFOL 200 MCG/KG/MIN: 10 INJECTION, EMULSION INTRAVENOUS at 12:03

## 2021-08-26 RX ADMIN — SODIUM CHLORIDE: 9 INJECTION, SOLUTION INTRAVENOUS at 11:58

## 2021-08-26 NOTE — PROGRESS NOTES
Pablo Staples  1950  [id number]    Situation:  Verbal report received from: [facility] RN   Procedure: Procedure(s):  ESOPHAGOGASTRODUODENOSCOPY (EGD)    Background:    Preoperative diagnosis: DYSPHAGIA  Postoperative diagnosis: * No post-op diagnosis entered *    :  Dr. Manuel Keenan  Assistant(s): Endoscopy RN-1: Radha Soto RN    Specimens:   ID Type Source Tests Collected by Time Destination   1 :     Jacky Pena MD 8/26/2021 1216 Pathology     H. Pylori  no    Assessment:    Anesthesia gave intra-procedure sedation and medications, see anesthesia flow sheet no    Intravenous fluids: NS@ KVO     Vital signs stable     Abdominal assessment: round and soft     Recommendation:  Discharge patient per MD order.   Return to floor  Family or Friend   Permission to share finding with family or friend yes

## 2021-08-26 NOTE — PROCEDURES
Kristin Pike M.D.  [phone]           2021                EGD Operative Report  Grant Sparks  :  1950  [facility] Medical Record Number:  [id number]      Indication:  Dysphagia/odynophagia, GERD, unintentional weight loss     : Darlin Sommers MD    Referring Provider:  Reina Hahn MD      Anesthesia/Sedation:  MAC anesthesia    Airway assessment: No airway problems anticipated    Pre-Procedural Exam:      Airway: clear, no airway problems anticipated  Heart: RRR, without gallops or rubs  Lungs: clear bilaterally without wheezes, crackles, or rhonchi  Abdomen: soft, nontender, nondistended, bowel sounds present  Mental Status: awake, alert and oriented to person, place and time       Procedure Details     After infomed consent was obtained for the procedure, with all risks and benefits of procedure explained the patient was taken to the endoscopy suite and placed in the left lateral decubitus position. Following sequential administration of sedation as per above, the endoscope was inserted into the mouth and advanced under direct vision to second portion of the duodenum. A careful inspection was made as the gastroscope was withdrawn, including a retroflexed view of the proximal stomach; findings and interventions are described below. Findings:   Esophagus:Mucosa within normal throughout the esophagus. No narrowing or lesions seen. Mildly irregular Z-line was noted, otherwise no lesions or inflammation seen. Stomach: Mucosa within normal throughout the stomach. Duodenum/jejunum: normal    Therapies:  esophageal dilation with 18 to 20 mm sized balloon. No effective tear seen. Specimens: Duodenum, stomach, GE-junction and mid-esophagus           Complications:   None; patient tolerated the procedure well. EBL:  None.            Impression:   Upper endoscopy within normal     Recommendations:    -Await pathology.  -Continue with anti-reflux measures  -Will proceed with CT scan of chest abdomen and pelvis  -Consider esophageal motility testing if symptoms are persistent  -Follow-up office visit    Sandy Márquez MD

## 2021-08-26 NOTE — PROGRESS NOTES
Endoscopy discharge instructions have been reviewed and given to patient. The patient verbalized understanding and acceptance of instructions. Dr. Bridgette Howard discussed with patient and spouse procedure findings and next steps.

## 2021-08-26 NOTE — ANESTHESIA PREPROCEDURE EVALUATION
Relevant Problems   CARDIOVASCULAR   (+) CAD (coronary artery disease)      GASTROINTESTINAL   (+) Fatty liver       Anesthetic History   No history of anesthetic complications            Review of Systems / Medical History  Patient summary reviewed, nursing notes reviewed and pertinent labs reviewed    Pulmonary  Within defined limits                 Neuro/Psych   Within defined limits           Cardiovascular              CAD and PAD    Exercise tolerance: >4 METS  Comments: 3cm AAA   GI/Hepatic/Renal           PUD     Endo/Other  Within defined limits           Other Findings   Comments: Ulcerative colitis  Celiac disease           Physical Exam    Airway  Mallampati: II    Neck ROM: normal range of motion   Mouth opening: Normal     Cardiovascular  Regular rate and rhythm,  S1 and S2 normal,  no murmur, click, rub, or gallop  Rhythm: regular  Rate: normal         Dental  No notable dental hx       Pulmonary  Breath sounds clear to auscultation               Abdominal  GI exam deferred       Other Findings            Anesthetic Plan    ASA: 3  Anesthesia type: MAC          Induction: Intravenous  Anesthetic plan and risks discussed with: Patient

## 2021-08-26 NOTE — DISCHARGE INSTRUCTIONS
Omari Arredondo M.D.  [phone]           2021  Beryle Dross  :  1950  [employer] Medical Record Number:  [id number]        ENDOSCOPY FINDINGS:   Your endoscopy showed no narrowing or inflammation, empiric dilation was performed and biopsies were obtained. EGD DISCHARGE INSTRUCTIONS    DISCOMFORT:  Sore throat- throat lozenges or warm salt water gargle  redness at IV site- apply warm compress to area; if redness or soreness persist- contact your physician  Gaseous discomfort- walking, belching will help relieve any discomfort  You may not operate a vehicle for 12 hours  You may not engage in an occupation involving machinery or appliances for rest of today  You may not drink alcoholic beverages for at least 12 hours  Avoid making any critical decisions for at least 24 hour    DIET:   You may resume your regular diet. ACTIVITY  Spend the remainder of the day resting -  avoid any strenuous activity. Avoid driving or operating machinery. CALL M.D. ANY SIGN OF   Increasing pain, nausea, vomiting  Abdominal distension (swelling)  New increased bleeding (oral or rectal)  Fever (chills)  Pain in chest area  Bloody discharge from nose or mouth  Shortness of breath    Follow-up Instructions:   Call Dr. Mariia Bob for any questions or problems. Telephone # [phone]  Biopsies were obtained, the results will be available  in  5 to 7 days. We will call you to notify you of these results. Can take pantoprazole on as needed basis. Will discuss getting CT scans and if negative and symptoms are persistent, will need esophageal motility.

## 2021-08-26 NOTE — H&P
The patient is a 80year old male who presents with a complaint of Dysphagia. The patient presents for due to new symptoms. Note for \"Dysphagia\": Back in April he started with upper respiratory symptoms, did not feel better and was seen by Dr. Lilia Carreon from ENT and evaluation was negative. Was started on Nexium without help, sucralfate was added without help. He then started to feel difficulty swallowing and chocking and feeling gagging and a sensation of a lump most of the time. He was having symptoms of heartburn and burping all the time. He lost 12 lbs since April and he reports no appetite and no desire to eat, smell of food can give him nausea. He reports difficulty swallowing solids but not liquids. He denies any choking with liquids. Additional reasons for visit:    Ulcerative Colitis is described as the following: The patient presents for routine follow-up. Note for \"Ulcerative Colitis\": HE reports taking mesalamine 2 tablets daily and doing well. He denies any issues with that. Last colonoscopy was in December 2020. 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 H/O elbow surgery (D39.873)   Left, Date: 47. CORONARY STENT PLACEMENT (03661)   Date: 2001. Umbilical hernia repair   Date: 2003. Allergies Rocky Rogers; 8/11/2021 10:47 AM)  Sulfa Drugs   Rash  Penicillins   Rash    Medication History Rocky Rogers; 8/11/2021 10:47 AM)  Kristopher Fearing (0.375GM Capsule ER 24HR, 4 Oral daily, Taken starting 02/08/2021) Active.   Losartan Potassium-HCTZ  (100-12.5MG Tablet, 1 Oral daily) Active. Norvasc  (5MG Tablet, Oral daily) Active. Atenolol  (50MG Tablet, Oral daily) Active. Multiple Vitamins  (Oral daily) Active. Atacand  (32MG Tablet, Oral daily) Active. Rosuvastatin Calcium  (40MG Tablet, Oral daily) Active. Vitamin D  (2000UNIT Capsule, Oral 1 PO QD) Active. Krill Oil  (1 Oral daily) Active. Medications Reconciled     Family History Yfn Messer; 8/11/2021 10:47 AM)  Heart Disease   Father, Mother. Breast Cancer   Sister. Social History Yfn Messer; 8/11/2021 10:47 AM)  Alcohol Use   Occasional alcohol use. Marital status   . Employment status   Full-time. Blood Transfusion   No.  Tobacco Use   Former smoker. Diagnostic Studies History Yfn Messer; 8/11/2021 10:47 AM)  Endoscopy    Colonoscopy   [07/24/2015]: Health Maintenance History Yfn Messer; 8/11/2021 10:47 AM)  Flu Vaccine   Date: 10/2014. Pneumovax   unknown        Review of Systems Yfn Messer; 8/11/2021 10:47 AM)  General Not Present- Chronic Fatigue, Poor Appetite, Weight Gain and Weight Loss. Skin Not Present- Itching, Rash and Skin Color Changes. HEENT Not Present- Hearing Loss and Vertigo. Respiratory Not Present- Difficulty Breathing and TB exposure. Cardiovascular Not Present- Chest Pain, Use of Antibiotics before Dental Procedures and Use of Blood Thinners. Gastrointestinal Present- See HPI. Musculoskeletal Not Present- Arthritis, Hip Replacement Surgery and Knee Replacement Surgery. Neurological Not Present- Weakness. Psychiatric Not Present- Depression. Endocrine Not Present- Diabetes and Thyroid Problems. Hematology Not Present- Anemia. Vitals Yfn Messer; 8/11/2021 10:51 AM)  8/11/2021 10:47 AM  Weight: 191 lb   Height: 68 in   Body Surface Area: 2 m²   Body Mass Index: 29.04 kg/m²    BP: 135/62(Sitting, Left Arm, Standard)              Physical Exam (Alejandro Anaya MD; 8/11/2021 11:23 AM)  General  Mental Status - Alert.   General Appearance - Cooperative, Pleasant, Not in acute distress. Orientation - Oriented X3. Build & Nutrition - Well nourished and Well developed. Eye  Eyeball - Left - No Exophthalmos. Eyeball - Right - No Exophthalmos. Sclera/Conjunctiva - Left - No Jaundice. Sclera/Conjunctiva - Right - No Jaundice. Chest and Lung Exam  Chest and lung exam reveals  - quiet, even and easy respiratory effort with no use of accessory muscles. Auscultation  Breath sounds - Normal. Adventitious sounds - No Adventitious sounds. Cardiovascular  Auscultation  Rhythm - Regular, No Tachycardia, No Bradycardia . Heart Sounds - Normal heart sounds , S1 WNL and S2 WNL, No S3, No Summation Gallop. Murmurs & Other Heart Sounds - Auscultation of the heart reveals - No Murmurs. Abdomen  Inspection  Inspection of the abdomen reveals - Non-distended. Palpation/Percussion  Tenderness - Non-Tender. Rebound tenderness - No rebound. Abdominal Mass Palpable - No masses. Other Characteristics - No Ascites. Organomegally - None. Auscultation  Auscultation of the abdomen reveals - Bowel sounds normal, No Abdominal bruits and No Succussion splash. Musculoskeletal  Global Assessment  Gait and Station - normal posture. Assessment & Plan (Alejandro Khalil MD; 8/11/2021 1:37 PM)  Dysphagia (R13.10)  Impression: Concerned about his ongoing and worsening symptoms with weight loss. Will start pantoprazole 40 mg twice a day and proceed with EGD in the next couple of weeks to rule out any mucosal lesions. Recommended he chews food very well, eat slowly and have sips of water with meals while remaining on soft diet.   Current Plans  ENDOSCOPY, UPPER GI, DIAGNOSTIC (46691) (Discussed risks and benefits with the patient to include: perforation,or post biopsy bleeding, missed lesions, and sedation reactions.)  Esophagitis, reflux (K21.00)  Impression: Last endoscopy in 2014 showed mild reflux changes, biopsies were negative for Vasquez's esophagus. Current Plans  Started Pantoprazole Sodium 40MG, 1 (one) Tablet DR daily, #60, 30 days starting 08/11/2021, Ref. x3.  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 Current Plans  Pt Education - How to access health information online: discussed with patient and provided information. Patient is to call me for any questions or concerns.

## 2021-08-26 NOTE — PERIOP NOTES
1202  Anesthesia staff at patient's bedside administering anesthesia and monitoring patients vital signs throughout procedure. See anesthesia note. Post procedure, report received from Karen SMITH.    1216  Endoscope was pre-cleaned at bedside immediately following procedure by endo Juan David wellington. 1219  Patient tolerated procedure. Abdomen soft and patient arousable and voices no complaints. Patient transported to endoscopy recovery area. Report given to post procedure RNHusam.

## 2021-08-26 NOTE — PROGRESS NOTES
TRANSFER - IN REPORT:    Verbal report received from [facility] on [employer]  being received from [employer] [facility] for routine post - op      Report consisted of patients Situation, Background, Assessment and   Recommendations(SBAR). Information from the following report(s) Procedure Summary, Intake/Output, MAR and Recent Results was reviewed with the receiving nurse. Opportunity for questions and clarification was provided. Assessment completed upon patients arrival to unit and care assumed.

## 2021-08-31 ENCOUNTER — TRANSCRIBE ORDER (OUTPATIENT)
Dept: SCHEDULING | Age: 71
End: 2021-08-31

## 2021-08-31 DIAGNOSIS — R07.89 STERNAL PAIN: ICD-10-CM

## 2021-08-31 DIAGNOSIS — R63.4 WEIGHT LOSS: ICD-10-CM

## 2021-08-31 DIAGNOSIS — R63.0 LOSS OF APPETITE: ICD-10-CM

## 2021-08-31 DIAGNOSIS — R13.10 DYSPHAGIA: Primary | ICD-10-CM

## 2021-09-09 ENCOUNTER — HOSPITAL ENCOUNTER (OUTPATIENT)
Dept: CT IMAGING | Age: 71
Discharge: HOME | End: 2021-09-09
Attending: INTERNAL MEDICINE
Payer: MEDICARE

## 2021-09-09 DIAGNOSIS — R13.10 DYSPHAGIA: ICD-10-CM

## 2021-09-09 DIAGNOSIS — R63.0 LOSS OF APPETITE: ICD-10-CM

## 2021-09-09 DIAGNOSIS — R07.89 STERNAL PAIN: ICD-10-CM

## 2021-09-09 DIAGNOSIS — R63.4 WEIGHT LOSS: ICD-10-CM

## 2021-09-09 LAB — CREAT BLD-MCNC: 1.2 MG/DL (ref 0.6–1.3)

## 2021-09-09 PROCEDURE — 74177 CT ABD & PELVIS W/CONTRAST: CPT

## 2021-09-09 PROCEDURE — 82565 ASSAY OF CREATININE: CPT

## 2021-09-09 PROCEDURE — 74011000636 HC RX REV CODE- 636: Performed by: STUDENT IN AN ORGANIZED HEALTH CARE EDUCATION/TRAINING PROGRAM

## 2021-09-09 RX ADMIN — IOPAMIDOL 100 ML: 755 INJECTION, SOLUTION INTRAVENOUS at 07:49

## 2021-11-06 ENCOUNTER — TRANSCRIBE ORDER (OUTPATIENT)
Dept: SCHEDULING | Age: 71
End: 2021-11-06

## 2021-11-06 DIAGNOSIS — Q35.9 CLEFT PALATE: ICD-10-CM

## 2021-11-06 DIAGNOSIS — Z78.9 NONSMOKER: Primary | ICD-10-CM

## 2021-11-06 DIAGNOSIS — Q38.8 VELOPHARYNGEAL INSUFFICIENCY (VPI), CONGENITAL: ICD-10-CM

## 2021-11-06 DIAGNOSIS — Z00.8 OTHER SPECIFIED GENERAL MEDICAL EXAMINATION: ICD-10-CM

## 2021-11-08 ENCOUNTER — TRANSCRIBE ORDER (OUTPATIENT)
Dept: SCHEDULING | Age: 71
End: 2021-11-08

## 2021-11-08 DIAGNOSIS — R13.10 DYSPHAGIA: Primary | ICD-10-CM

## 2021-11-16 ENCOUNTER — HOSPITAL ENCOUNTER (OUTPATIENT)
Dept: GENERAL RADIOLOGY | Age: 71
Discharge: HOME | End: 2021-11-16
Attending: PHYSICIAN ASSISTANT
Payer: MEDICARE

## 2021-11-16 DIAGNOSIS — R13.10 DYSPHAGIA: ICD-10-CM

## 2021-11-16 PROCEDURE — 92611 MOTION FLUOROSCOPY/SWALLOW: CPT | Performed by: SPEECH-LANGUAGE PATHOLOGIST

## 2021-11-16 PROCEDURE — 74230 X-RAY XM SWLNG FUNCJ C+: CPT

## 2021-11-16 NOTE — PROGRESS NOTES
[address], [address] STUDY  Patient: Christine Castro (82 y.o. male)  Date: 11/16/2021  Referring Provider:  Santos Chaney PA-C    SUBJECTIVE:   Patient reports globus sensation in the throat since last April. He reports having seen GI as well as ENT with no significant findings. He also endorses early satiety after 4-5 bites. Significant weight loss unintentionally. Esophagram planned for later this week. He denies any other medical changes that correlate with onset of symptoms other than starting a new cholesterol medication and second COVID shot. No dysphonia.      OBJECTIVE:   Past Medical History:    Past Medical History:   Diagnosis Date    Abdominal aneurysm without mention of rupture     small infrarenal    Benign hypertensive heart disease without heart failure     CAD (coronary artery disease)     silent ischemia, 1v CAD, high grade RCA lesions s/p PCI multilink stents (3x23mm, 3x18mm) Deborr Payor    Celiac disease 4/22/2015    Dyslipidemia 10/29/2012    Hernia, inguinal     Insulin resistance 5/6/2013    Pure hypercholesterolemia     Ulcerative colitis ([facility] [address]     Dr Gerri Galindo     Past Surgical History:   Procedure Laterality Date    COLONOSCOPY N/A 8/29/2018    COLONOSCOPY performed by Marcie Shaw MD at [address] [facility] COLONOSCOPY N/A 12/3/2020    COLONOSCOPY performed by Sydnee Mathias MD at [address] [facility] HX CORONARY STENT PLACEMENT  5/2000    HX HEMORRHOIDECTOMY      HX HERNIA REPAIR  11/2012    x2    HX ORTHOPAEDIC Left 1985    elbow tendon repair    HX OTHER SURGICAL Right 01/2020    catract, vitrectomy    CT CARDIAC SURG PROCEDURE UNLIST  2001    stents in arteries    STRESS TEST CARDIOLITE  2/21/11    7 min, normal perfusion, EF 73%    US AORTA B-SCAN LTD  3/2011    2 small infrarenal AAA, 3 cm each    US AORTA B-SCAN LTD  2/20/12    small infrarenal AAA, unchanged from one year ago     Current Dietary Status:  Regular with thins  Radiologist: Dr Romel Arias Views: Lateral; Fluoro  Patient Position: standing    Trial 1:   Consistency Presented: Thin liquid; Solid; Pudding   How Presented: Self-fed/presented; Cup/sip; Cup/gulp; Successive swallows       Bolus Acceptance: No impairment   Bolus Formation/Control: No impairment:     Propulsion: No impairment   Oral Residue: None   Initiation of Swallow: No impairment   Timing: No impairment   Penetration: None   Aspiration/Timing: No evidence of aspiration   Pharyngeal Clearance: No residue                       Decreased Tongue Base Retraction?: No  Laryngeal Elevation: WFL (within functional limits)  Aspiration/Penetration Score: 1 (No penetration or aspiration-Contrast does not enter the airway)  Pharyngeal Symmetry: Not assessed  Pharyngeal-Esophageal Segment: No impairment  Pharyngeal Dysfunction: None    Oral Phase Severity: No impairment       ASSESSMENT :  Based on the objective data described above, the patient presents with functional oropharyngeal swallow. There is no penetration, aspiration, or pharyngeal residue noted at any time during the study. PLAN/RECOMMENDATIONS :  Diet as tolerated. Follow up with MD for further testing to determine etiology of globus sensation. COMMUNICATION/EDUCATION:   The above findings and recommendations were discussed with: patient who verbalized understanding.     Thank you for this referral.  Claritza Guidry, SLP  Time Calculation: 30 mins

## 2021-11-18 ENCOUNTER — HOSPITAL ENCOUNTER (OUTPATIENT)
Dept: GENERAL RADIOLOGY | Age: 71
Discharge: HOME | End: 2021-11-18
Attending: PHYSICIAN ASSISTANT
Payer: MEDICARE

## 2021-11-18 DIAGNOSIS — R13.10 DYSPHAGIA: ICD-10-CM

## 2021-11-18 PROCEDURE — 74220 X-RAY XM ESOPHAGUS 1CNTRST: CPT

## 2023-05-12 RX ORDER — ASPIRIN 81 MG/1
TABLET ORAL DAILY
COMMUNITY

## 2023-05-12 RX ORDER — AMLODIPINE BESYLATE 5 MG/1
5 TABLET ORAL DAILY
COMMUNITY

## 2023-05-12 RX ORDER — LOSARTAN POTASSIUM AND HYDROCHLOROTHIAZIDE 12.5; 5 MG/1; MG/1
1 TABLET ORAL DAILY
COMMUNITY

## 2023-05-12 RX ORDER — ATENOLOL 50 MG/1
TABLET ORAL DAILY
COMMUNITY

## 2023-05-12 RX ORDER — ROSUVASTATIN CALCIUM 10 MG/1
TABLET, COATED ORAL
COMMUNITY

## 2023-05-12 RX ORDER — MESALAMINE 500 MG/1
CAPSULE, EXTENDED RELEASE ORAL 2 TIMES DAILY
COMMUNITY

## 2024-02-29 RX ORDER — LOSARTAN POTASSIUM AND HYDROCHLOROTHIAZIDE 12.5; 1 MG/1; MG/1
1 TABLET ORAL DAILY
COMMUNITY

## 2024-02-29 RX ORDER — EVOLOCUMAB 140 MG/ML
140 INJECTION, SOLUTION SUBCUTANEOUS
COMMUNITY

## 2024-02-29 RX ORDER — ACETAMINOPHEN 160 MG
2000 TABLET,DISINTEGRATING ORAL DAILY
COMMUNITY

## 2024-02-29 RX ORDER — MESALAMINE 0.38 G/1
0.38 CAPSULE, EXTENDED RELEASE ORAL 2 TIMES DAILY
COMMUNITY

## 2024-02-29 RX ORDER — ROSUVASTATIN CALCIUM 20 MG/1
20 TABLET, COATED ORAL EVERY EVENING
COMMUNITY

## 2024-02-29 NOTE — FLOWSHEET NOTE
[facility]  Endoscopy Preprocedure Instructions      1. On the day of your surgery, please report to registration located on the 2nd floor of the  the Main Hospital. yes    2. You must have a responsible adult to drive you to the hospital, stay at the hospital during your procedure and drive you home. If they leave your procedure will not be started (no exceptions). yes    3. Do not have anything to eat or drink (including water, gum, mints, coffee, and juice) after midnight. This does not apply to the medications you were instructed to take by your physician.yes  If you are currently taking Plavix, Coumadin, Aspirin, or other blood-thinning agents, contact your physician for special instructions. yes,aspirin    4. If you are having a procedure that requires bowel prep: We recommend that you have only clear liquids the day before your procedure and begin your bowel prep by 5:00 pm.  You may continue to drink clear liquids until midnight.  If for any reason you are not able to complete your prep please notify your physician immediately. yes    5. Have a list of all current medications, including vitamins, herbal supplements and any other over the counter medications. Reviewed over the phone    6. If you wear glasses, contacts, dentures and/or hearing aids, they may be removed prior to procedure, please bring a case to store them in. yes    7. You should understand that if you do not follow these instructions your procedure may be cancelled.  If your physical condition changes (I.e. fever, cold or flu) please contact your doctor as soon as possible.    8. It is important that you be on time.  If for any reason you are unable to keep your appointment please call [phone] the day of or your physician’s office prior to your scheduled procedure    9. Have you received your COVID Vaccine? yes If no, you will need to receive a COVID test/swab here at [facility] parking lot Monday - Friday 8a -  11am. There are no Saturday or Sunday swabbing at any [facility] facility. (patient verbalizes understanding) not applicable

## 2024-03-07 ENCOUNTER — HOSPITAL ENCOUNTER (OUTPATIENT)
Facility: HOSPITAL | Age: 74
Setting detail: OUTPATIENT SURGERY
Discharge: HOME | End: 2024-03-07
Attending: INTERNAL MEDICINE | Admitting: INTERNAL MEDICINE
Payer: MEDICARE

## 2024-03-07 ENCOUNTER — ANESTHESIA (OUTPATIENT)
Facility: HOSPITAL | Age: 74
End: 2024-03-07
Payer: MEDICARE

## 2024-03-07 ENCOUNTER — ANESTHESIA EVENT (OUTPATIENT)
Facility: HOSPITAL | Age: 74
End: 2024-03-07
Payer: MEDICARE

## 2024-03-07 VITALS
HEART RATE: 82 BPM | BODY MASS INDEX: 28.2 KG/M2 | SYSTOLIC BLOOD PRESSURE: 138 MMHG | TEMPERATURE: 98.3 F | OXYGEN SATURATION: 98 % | WEIGHT: 186.07 LBS | HEIGHT: 68 IN | DIASTOLIC BLOOD PRESSURE: 73 MMHG | RESPIRATION RATE: 17 BRPM

## 2024-03-07 PROCEDURE — 3600007502: Performed by: INTERNAL MEDICINE

## 2024-03-07 PROCEDURE — 7100000011 HC PHASE II RECOVERY - ADDTL 15 MIN: Performed by: INTERNAL MEDICINE

## 2024-03-07 PROCEDURE — 6360000002 HC RX W HCPCS: Performed by: NURSE ANESTHETIST, CERTIFIED REGISTERED

## 2024-03-07 PROCEDURE — 3700000000 HC ANESTHESIA ATTENDED CARE: Performed by: INTERNAL MEDICINE

## 2024-03-07 PROCEDURE — 88305 TISSUE EXAM BY PATHOLOGIST: CPT

## 2024-03-07 PROCEDURE — 7100000010 HC PHASE II RECOVERY - FIRST 15 MIN: Performed by: INTERNAL MEDICINE

## 2024-03-07 PROCEDURE — 6370000000 HC RX 637 (ALT 250 FOR IP): Performed by: INTERNAL MEDICINE

## 2024-03-07 PROCEDURE — 3600007512: Performed by: INTERNAL MEDICINE

## 2024-03-07 PROCEDURE — 3700000001 HC ADD 15 MINUTES (ANESTHESIA): Performed by: INTERNAL MEDICINE

## 2024-03-07 RX ORDER — PHENYLEPHRINE HCL IN 0.9% NACL 0.4MG/10ML
SYRINGE (ML) INTRAVENOUS PRN
Status: DISCONTINUED | OUTPATIENT
Start: 2024-03-07 | End: 2024-03-07 | Stop reason: SDUPTHER

## 2024-03-07 RX ORDER — SIMETHICONE 40MG/0.6ML
SUSPENSION, DROPS(FINAL DOSAGE FORM)(ML) ORAL
Status: DISCONTINUED
Start: 2024-03-07 | End: 2024-03-07 | Stop reason: HOSPADM

## 2024-03-07 RX ORDER — PROPOFOL 10 MG/ML
INJECTION, EMULSION INTRAVENOUS PRN
Status: DISCONTINUED | OUTPATIENT
Start: 2024-03-07 | End: 2024-03-07 | Stop reason: SDUPTHER

## 2024-03-07 RX ORDER — SIMETHICONE 40MG/0.6ML
40 SUSPENSION, DROPS(FINAL DOSAGE FORM)(ML) ORAL EVERY 6 HOURS PRN
Status: DISCONTINUED | OUTPATIENT
Start: 2024-03-07 | End: 2024-03-07 | Stop reason: HOSPADM

## 2024-03-07 RX ORDER — SODIUM CHLORIDE 9 MG/ML
INJECTION, SOLUTION INTRAVENOUS CONTINUOUS
Status: DISCONTINUED | OUTPATIENT
Start: 2024-03-07 | End: 2024-03-07 | Stop reason: HOSPADM

## 2024-03-07 RX ADMIN — PROPOFOL 50 MG: 10 INJECTION, EMULSION INTRAVENOUS at 11:48

## 2024-03-07 RX ADMIN — Medication 80 MCG: at 11:40

## 2024-03-07 RX ADMIN — PROPOFOL 150 MG: 10 INJECTION, EMULSION INTRAVENOUS at 11:41

## 2024-03-07 RX ADMIN — Medication 80 MCG: at 11:45

## 2024-03-07 RX ADMIN — SIMETHICONE 1.2 MG: 20 SUSPENSION/ DROPS ORAL at 11:49

## 2024-03-07 RX ADMIN — Medication 80 MCG: at 11:50

## 2024-03-07 ASSESSMENT — PAIN - FUNCTIONAL ASSESSMENT
PAIN_FUNCTIONAL_ASSESSMENT: 0-10
PAIN_FUNCTIONAL_ASSESSMENT: ACTIVITIES ARE NOT PREVENTED

## 2024-03-07 NOTE — H&P
[facility]  Clayton Pitt M.D.  [phone]    History and Physical       NAME:  Ayo Mcclure   :   1950   MRN:   [id number]           Consult Date: 3/7/2024 11:43 AM    Chief Complaint: Colitis surveillance    History of Present Illness:  Patient is a 73 y.o. who is seen for colitis surveillance. Denies any ongoing GI complaints.      PMH:  Past Medical History:   Diagnosis Date    Abdominal aneurysm without mention of rupture     small infrarenal    Benign hypertensive heart disease without heart failure     CAD (coronary artery disease)     silent ischemia, 1v CAD, high grade RCA lesions s/p PCI multilink stents (3x23mm, 3x18mm) Héctor Carmen    Celiac disease 2015    Dyslipidemia 10/29/2012    Hernia, inguinal     Hypertension     Insulin resistance 2013    Kidney stone     Pure hypercholesterolemia     Ulcerative colitis (HCC)     Dr Pitt       PSH:  Past Surgical History:   Procedure Laterality Date    ABDOMINAL AORTIC ANEURYSM REPAIR  2023    went in through arteries in legs    COLONOSCOPY N/A 12/3/2020    COLONOSCOPY performed by Clayton Pitt MD at [facility] ENDOSCOPY    COLONOSCOPY N/A 2018    COLONOSCOPY performed by Jamal Flores MD at [facility] ENDOSCOPY    CORONARY ANGIOPLASTY WITH STENT PLACEMENT  2000    HEMORRHOID SURGERY      HERNIA REPAIR  11/2012    x2 - umbilical    ORTHOPEDIC SURGERY Left 1985    elbow tendon repair    OTHER SURGICAL HISTORY Bilateral 2020    bilateral cataract, right vitrectomy    MN UNLISTED PROCEDURE CARDIAC SURGERY      stents in arteries - 2 stents    STRESS TEST, LEXISCAN  11    7 min, normal perfusion, EF 73%    US ABDOMINAL AORTA LIMITED  3/2011    2 small infrarenal AAA, 3 cm each    US ABDOMINAL AORTA LIMITED  12    small infrarenal AAA, unchanged from one year ago    WISDOM TOOTH EXTRACTION         Allergies:  Allergies   Allergen Reactions    Adhesive Tape Rash    Other Other (See Comments)     Gluten/has

## 2024-03-07 NOTE — ANESTHESIA POSTPROCEDURE EVALUATION
Department of Anesthesiology  Postprocedure Note    Patient: Ayo Mcclure  MRN: [id number]  YOB: 1950  Date of evaluation: 3/7/2024    Procedure Summary       Date: 03/07/24 Room / Location: [facility] 03 / [facility] ENDOSCOPY    Anesthesia Start: 1135 Anesthesia Stop: 1200    Procedure: COLONOSCOPY WITH BIOPSY (Lower GI Region) Diagnosis:       History of colonic polyps      (History of colonic polyps [Z86.010])    Surgeons: Clayton Pitt MD Responsible Provider: John Mcghee MD    Anesthesia Type: MAC ASA Status: 2            Anesthesia Type: MAC    Hawa Phase I: Hawa Score: 10    Hawa Phase II: Hawa Score: 9    Anesthesia Post Evaluation    No notable events documented.

## 2024-03-07 NOTE — DISCHARGE INSTRUCTIONS
[facility]  Clayton Pitt M.D.  [phone]            COLON DISCHARGE INSTRUCTIONS       3/7/2024    Ayo Mcclure  :  1950  [place] Medical Record Number:  [id number]      COLONOSCOPY FINDINGS:  Your colonoscopy showed mild diverticulosis in the left colon and small internal hemorrhoids, otherwise no inflammation seen. Biopsies were obtained.    DISCOMFORT:  Redness at IV site- apply warm compress to area; if redness or soreness persist- contact your physician  There may be a slight amount of blood passed from the rectum  Gaseous discomfort- walking, belching will help relieve any discomfort  You may not operate a vehicle for 12 hours  You may not engage in an occupation involving machinery or appliances for rest of today  You may not drink alcoholic beverages for at least 12 hours  Avoid making any critical decisions for at least 24 hour  DIET:   Gluten free diet   - however -  remember your colon is empty and a heavy meal will produce gas.   Avoid these foods:  vegetables, fried / greasy foods, carbonated drinks for today     ACTIVITY:  You may resume your normal daily activities it is recommended that you spend the remainder of the day resting -  avoid any strenuous activity.    CALL M.DAdelina  ANY SIGN OF:   Increasing pain, nausea, vomiting  Abdominal distension (swelling)  New increased bleeding (oral or rectal)  Fever (chills)  Pain in chest area  Bloody discharge from nose or mouth   Shortness of breath    Follow-up Instructions:   Call Dr. Pitt if any questions or problems.   Telephone # [phone]  Biopsy results will be available in  5 to 7 days  Repeat colonoscopy in 3 years. Continue current medications.

## 2024-03-07 NOTE — OP NOTE
[facility]  Clayton Pitt M.D.  [phone]            3/7/2024          Colonoscopy Operative Report  Ayo Mcclure  :  1950  [place] Medical Record Number:  [id number]      Indications:    Ulcerative colitis     :  Clayton Pitt MD    Referring Provider: Baltazar Robertson MD    Sedation:  MAC anesthesia    Pre-Procedural Exam:      Airway: clear,  No airway problems anticipated  Heart: RRR, without gallops or rubs  Lungs: clear bilaterally without wheezes, crackles, or rhonchi  Abdomen: soft, nontender, nondistended, bowel sounds present  Mental Status: awake, alert and oriented to person, place and time     Procedure Details:  After informed consent was obtained with all risks and benefits of procedure explained and preoperative exam completed, the patient was taken to the endoscopy suite and placed in the left lateral decubitus position.  Upon sequential sedation as per above, a digital rectal exam was performed. The Olympus videocolonoscope  was inserted in the rectum and carefully advanced to the cecum, which was identified by the ileocecal valve and appendiceal orifice, terminal ileum.  The quality of preparation was good.  The colonoscope was slowly withdrawn with careful inspection and evaluation between folds. Retroflexion in the rectum was performed.    Findings:   Terminal Ileum: normal  Cecum: normal  Ascending Colon: normal  Transverse Colon: normal  Descending Colon: no mucosal lesion appreciated  mild diverticulosis;  Sigmoid: no mucosal lesion appreciated. Healed mucosa noted.  mild diverticulosis;  Rectum: no mucosal lesion appreciated. Healed mucosa noted, no active inflammation seen.  Grade 1 internal hemorrhoid(s);    Interventions:  none    Specimen Removed:   Right colon, left colon and rectum    Complications: None.     EBL:  None.    Impression:  Mild left colon diverticulosis seen, otherwise mucosa within normal.                         Healed mucosa  noted in the distal sigmoid and rectum.    Recommendations:  -Await pathology.   -Gluten free diet.    -Resume current medication(s)    Discharge Disposition:  Home in the company of a  when able to ambulate.    Clayton Pitt MD  3/7/2024  12:02 PM

## 2024-03-07 NOTE — ANESTHESIA PRE PROCEDURE
Vascular: negative vascular ROS.         Other Findings:             Anesthesia Plan      TIVA and MAC     ASA 2       Induction: intravenous.      Anesthetic plan and risks discussed with patient.        Attending anesthesiologist reviewed and agrees with Preprocedure content                John Mcghee MD   3/7/2024

## 2024-03-07 NOTE — PERIOP NOTE
Initial RN admission and assessment performed and documented in Endoscopy navigator.     Patient evaluated by anesthesia in pre-procedure holding.     All procedural vital signs, airway assessment, and level of consciousness information monitored and recorded by anesthesia staff on the anesthesia record.     Report received from ZACHARY Ding post procedure.  Patient transported to recovery area by RN.    Report given to post procedure Solange RIVERA    Endoscope was pre-cleaned at bedside immediately following procedure by Kyle

## 2024-08-01 ENCOUNTER — HOSPITAL ENCOUNTER (OUTPATIENT)
Facility: HOSPITAL | Age: 74
Setting detail: OUTPATIENT SURGERY
Discharge: HOME | End: 2024-08-01
Attending: INTERNAL MEDICINE | Admitting: INTERNAL MEDICINE
Payer: MEDICARE

## 2024-08-01 ENCOUNTER — ANESTHESIA EVENT (OUTPATIENT)
Facility: HOSPITAL | Age: 74
End: 2024-08-01
Payer: MEDICARE

## 2024-08-01 ENCOUNTER — ANESTHESIA (OUTPATIENT)
Facility: HOSPITAL | Age: 74
End: 2024-08-01
Payer: MEDICARE

## 2024-08-01 VITALS
WEIGHT: 183.42 LBS | DIASTOLIC BLOOD PRESSURE: 57 MMHG | HEART RATE: 68 BPM | OXYGEN SATURATION: 98 % | BODY MASS INDEX: 27.8 KG/M2 | SYSTOLIC BLOOD PRESSURE: 133 MMHG | RESPIRATION RATE: 17 BRPM | HEIGHT: 68 IN | TEMPERATURE: 97.9 F

## 2024-08-01 PROCEDURE — 2580000003 HC RX 258: Performed by: NURSE ANESTHETIST, CERTIFIED REGISTERED

## 2024-08-01 PROCEDURE — 88305 TISSUE EXAM BY PATHOLOGIST: CPT

## 2024-08-01 PROCEDURE — 3600007512: Performed by: INTERNAL MEDICINE

## 2024-08-01 PROCEDURE — 3700000001 HC ADD 15 MINUTES (ANESTHESIA): Performed by: INTERNAL MEDICINE

## 2024-08-01 PROCEDURE — 7100000010 HC PHASE II RECOVERY - FIRST 15 MIN: Performed by: INTERNAL MEDICINE

## 2024-08-01 PROCEDURE — 7100000011 HC PHASE II RECOVERY - ADDTL 15 MIN: Performed by: INTERNAL MEDICINE

## 2024-08-01 PROCEDURE — 2720000010 HC SURG SUPPLY STERILE: Performed by: INTERNAL MEDICINE

## 2024-08-01 PROCEDURE — 3700000000 HC ANESTHESIA ATTENDED CARE: Performed by: INTERNAL MEDICINE

## 2024-08-01 PROCEDURE — 3600007502: Performed by: INTERNAL MEDICINE

## 2024-08-01 PROCEDURE — 6360000002 HC RX W HCPCS: Performed by: NURSE ANESTHETIST, CERTIFIED REGISTERED

## 2024-08-01 PROCEDURE — 2709999900 HC NON-CHARGEABLE SUPPLY: Performed by: INTERNAL MEDICINE

## 2024-08-01 RX ORDER — SODIUM CHLORIDE 9 MG/ML
INJECTION, SOLUTION INTRAVENOUS CONTINUOUS
Status: CANCELLED | OUTPATIENT
Start: 2024-08-01

## 2024-08-01 RX ORDER — LIDOCAINE HCL/PF 100 MG/5ML
SYRINGE (ML) INJECTION PRN
Status: DISCONTINUED | OUTPATIENT
Start: 2024-08-01 | End: 2024-08-01 | Stop reason: SDUPTHER

## 2024-08-01 RX ORDER — SODIUM CHLORIDE 9 MG/ML
INJECTION, SOLUTION INTRAVENOUS CONTINUOUS PRN
Status: DISCONTINUED | OUTPATIENT
Start: 2024-08-01 | End: 2024-08-01 | Stop reason: SDUPTHER

## 2024-08-01 RX ORDER — PROPOFOL 10 MG/ML
INJECTION, EMULSION INTRAVENOUS PRN
Status: DISCONTINUED | OUTPATIENT
Start: 2024-08-01 | End: 2024-08-01 | Stop reason: SDUPTHER

## 2024-08-01 RX ORDER — PANTOPRAZOLE SODIUM 20 MG/1
20 TABLET, DELAYED RELEASE ORAL DAILY
Status: ON HOLD | COMMUNITY
End: 2024-08-01

## 2024-08-01 RX ADMIN — LIDOCAINE HYDROCHLORIDE 100 MG: 20 INJECTION INTRAVENOUS at 10:43

## 2024-08-01 RX ADMIN — PROPOFOL 25 MG: 10 INJECTION, EMULSION INTRAVENOUS at 10:51

## 2024-08-01 RX ADMIN — PROPOFOL 200 MCG/KG/MIN: 10 INJECTION, EMULSION INTRAVENOUS at 10:44

## 2024-08-01 RX ADMIN — PROPOFOL 25 MG: 10 INJECTION, EMULSION INTRAVENOUS at 10:49

## 2024-08-01 RX ADMIN — PROPOFOL 25 MG: 10 INJECTION, EMULSION INTRAVENOUS at 10:47

## 2024-08-01 RX ADMIN — PROPOFOL 50 MG: 10 INJECTION, EMULSION INTRAVENOUS at 10:43

## 2024-08-01 RX ADMIN — SODIUM CHLORIDE: 9 INJECTION, SOLUTION INTRAVENOUS at 10:30

## 2024-08-01 ASSESSMENT — PAIN - FUNCTIONAL ASSESSMENT
PAIN_FUNCTIONAL_ASSESSMENT: 0-10
PAIN_FUNCTIONAL_ASSESSMENT: 0-10

## 2024-08-01 NOTE — DISCHARGE INSTRUCTIONS
[facility]  Clayton Pitt M.D.  [phone]           2024  Ayo Mcclure  :  1950  [facility] Medical Record Number:  [id number]        ENDOSCOPY FINDINGS:   Your endoscopy showed a small size hiatal hernia, minimal changes from acid reflux, no definite stricture seen. Empiric dilation was performed similar to last endoscopy in .      EGD DISCHARGE INSTRUCTIONS    DISCOMFORT:  Sore throat- throat lozenges or warm salt water gargle  redness at IV site- apply warm compress to area; if redness or soreness persist- contact your physician  Gaseous discomfort- walking, belching will help relieve any discomfort  You may not operate a vehicle for 12 hours  You may not engage in an occupation involving machinery or appliances for rest of today  You may not drink alcoholic beverages for at least 12 hours  Avoid making any critical decisions for at least 24 hour    DIET:   You may resume soft diet.    ACTIVITY  Spend the remainder of the day resting -  avoid any strenuous activity. Avoid driving or operating machinery.    CALL M.D.  ANY SIGN OF   Increasing pain, nausea, vomiting  Abdominal distension (swelling)  New increased bleeding (oral or rectal)  Fever (chills)  Pain in chest area  Bloody discharge from nose or mouth  Shortness of breath    Follow-up Instructions:   Call Dr. Pitt for any questions or problems. Telephone # [phone]  Bopsies were obtained, the results will be available  in  5 to 7 days. We will call you to notify you of these results.   Take lansoprazole 30 mg twice a day.  Follow up in the office.

## 2024-08-01 NOTE — ANESTHESIA POSTPROCEDURE EVALUATION
Department of Anesthesiology  Postprocedure Note    Patient: Ayo Mcclure  MRN: [id number]  YOB: 1950  Date of evaluation: 8/1/2024    Procedure Summary       Date: 08/01/24 Room / Location: [facility] 03 / [facility] ENDOSCOPY    Anesthesia Start: 1030 Anesthesia Stop: 1054    Procedures:       ESOPHAGOGASTRODUODENOSCOPY WITH BIOPSY AND DILATION (Upper GI Region)      ESOPHAGOGASTRODUODENOSCOPY DILATION BALLOON (Upper GI Region)      ESOPHAGOGASTRODUODENOSCOPY (Upper GI Region) Diagnosis:       Dysphagia, unspecified type      (Dysphagia, unspecified type [R13.10])    Surgeons: Clayton Pitt MD Responsible Provider: Gregg Vanessa MD    Anesthesia Type: MAC ASA Status: 2            Anesthesia Type: No value filed.    Hawa Phase I: Hawa Score: 10    Haaw Phase II: Hawa Score: 8    Anesthesia Post Evaluation    Patient location during evaluation: PACU  Patient participation: complete - patient participated  Level of consciousness: awake and alert  Pain score: 0  Airway patency: patent  Nausea & Vomiting: no vomiting and no nausea  Cardiovascular status: hemodynamically stable  Respiratory status: room air  Hydration status: stable  There was medical reason for not using a multimodal analgesia pain management approach.Pain management: adequate    No notable events documented.

## 2024-08-01 NOTE — PROGRESS NOTES
Endoscopy discharge instructions have been reviewed and given to patient.  The patient verbalized understanding and acceptance of instructions.      Dr. Pitt discussed with patient procedure findings and next steps.

## 2024-08-01 NOTE — H&P
by Alisha Brown PA-C (7/24/2024 3:13 PM)        Co-signed electronically by Clayton Pitt MD (7/24/2024 8:14 PM)

## 2024-08-01 NOTE — OP NOTE
[facility]  Clayton Pitt M.D.  [phone]           2024                EGD Operative Report  Ayo Mcclure  :  1950  [facility] Medical Record Number:  [id number]      Indication: Dyphagia/odynophagia, GERD    : Clayton Pitt MD    Referring Provider:  Hector Birmingham DO      Anesthesia/Sedation:  MAC anesthesia    Airway assessment: No airway problems anticipated    Pre-Procedural Exam:      Airway: clear, no airway problems anticipated  Heart: RRR, without gallops or rubs  Lungs: clear bilaterally without wheezes, crackles, or rhonchi  Abdomen: soft, nontender, nondistended, bowel sounds present  Mental Status: awake, alert and oriented to person, place and time       Procedure Details     After infomed consent was obtained for the procedure, with all risks and benefits of procedure explained the patient was taken to the endoscopy suite and placed in the left lateral decubitus position.  Following sequential administration of sedation as per above, the endoscope was inserted into the mouth and advanced under direct vision to second portion of the duodenum.  A careful inspection was made as the gastroscope was withdrawn, including a retroflexed view of the proximal stomach; findings and interventions are described below.      Findings:   Esophagus:Irregular Z-line and a wide salmon colored tongue about 1 cm in length was seen, suggestive of intestinal metaplasia. Widely patent schatzki's ring seen. Remaining mucosa of the esophagus was within normal.  Stomach: Small hiatal hernia, otherwise mucosa within normal.  Duodenum/jejunum: normal    Therapies:  esophageal dilation with 20 mm sized balloon done at the GE-junction, mid-esophagus and proximal esophagus. No effective tear seen.    Specimens:  GE-junction and mid-esophagus           Complications:   None; patient tolerated the procedure well.    EBL:  None.           Impression:   Small hiatal hernia

## 2024-08-01 NOTE — PERIOP NOTE

## 2024-08-01 NOTE — PROGRESS NOTES
Report received from Lorenza RIVERA s/p EGD with balloon dilation. Vital signs stable. See anesthesia flowsheet for intraprocedure meds.    Diane Enciso RN

## 2024-08-01 NOTE — ANESTHESIA PRE PROCEDURE
Cardiovascular:  Exercise tolerance: good (>4 METS)  (+) hypertension:, CAD: no interval change, CABG/stent:               ROS comment: H/O aortic aneurysm repair about one year ago     Neuro/Psych:   Negative Neuro/Psych ROS              GI/Hepatic/Renal:   (+) GERD: poorly controlled, PUD     (-) liver disease       Endo/Other: Negative Endo/Other ROS                    Abdominal: normal exam            Vascular:   + PVD, aortic or cerebral.       Other Findings:       Anesthesia Plan      MAC     ASA 2       Induction: intravenous.      Anesthetic plan and risks discussed with patient.    Use of blood products discussed with patient whom.    Plan discussed with CRNA.                Gregg Vanessa MD   8/1/2024

## 2025-04-03 DIAGNOSIS — Z95.1 POSTSURGICAL AORTOCORONARY BYPASS STATUS: Primary | ICD-10-CM

## (undated) DEVICE — SET ADMIN 16ML TBNG L100IN 2 Y INJ SITE IV PIGGY BK DISP

## (undated) DEVICE — ELECTRODE,RADIOTRANSLUCENT,FOAM,3PK: Brand: MEDLINE

## (undated) DEVICE — SOLIDIFIER MEDC 1200ML -- CONVERT TO 356117

## (undated) DEVICE — KIT COLON W/ 1.1OZ LUB AND 2 END

## (undated) DEVICE — BAG BELONG PT PERS CLEAR HANDL

## (undated) DEVICE — 1200 GUARD II KIT W/5MM TUBE W/O VAC TUBE: Brand: GUARDIAN

## (undated) DEVICE — Device

## (undated) DEVICE — SET GRAV CK VLV NEEDLESS ST 3 GANGED 4WAY STPCOCK HI FLO 10

## (undated) DEVICE — TRAP SUC MUCOUS 70ML -- MEDICHOICE MEDLINE

## (undated) DEVICE — 3M™ CUROS™ DISINFECTING CAP FOR NEEDLELESS CONNECTORS 270/CARTON 20 CARTONS/CASE CFF1-270: Brand: CUROS™

## (undated) DEVICE — (D)SENSOR RMFG 02 PULS OXMTR -- DISC BY MFR USE ITEM 133445

## (undated) DEVICE — CONTAINER SPEC 20 ML LID NEUT BUFF FORMALIN 10 % POLYPR STS

## (undated) DEVICE — SYRINGE INFL 60ML DISP ALLIANCE II

## (undated) DEVICE — CATH IV AUTOGRD BC PNK 20GA 25 -- INSYTE

## (undated) DEVICE — CANN NASAL O2 CAPNOGRAPHY AD -- FILTERLINE

## (undated) DEVICE — BAG SPEC BIOHZRD 10 X 10 IN --

## (undated) DEVICE — ESOPHAGEAL BALLOON DILATATION CATHETER: Brand: CRE FIXED WIRE

## (undated) DEVICE — BITEBLOCK ENDOSCP 60FR MAXI WHT POLYETH STURDY W/ VELC WVN

## (undated) DEVICE — CANNULA CUSH AD W/ 14FT TBG

## (undated) DEVICE — SOLIDIFIER FLD 2OZ 1500CC N DISINF IN BTL DISP SAFESORB

## (undated) DEVICE — SNARE ENDOSCP M L240CM W27MM SHTH DIA2.4MM CHN 2.8MM OVL

## (undated) DEVICE — CATHETER IV 22GA L1IN OD0.8382-0.9144MM ID0.6096-0.6858MM 382523

## (undated) DEVICE — CUFF RMFG BP INF SZ 11 DISP -- LAWSON OEM ITEM 238915

## (undated) DEVICE — FORCEPS BX L240CM JAW DIA2.8MM L CAP W/ NDL MIC MESH TOOTH

## (undated) DEVICE — SIMPLICITY FLUFF UNDERPAD 23X36, MODERATE: Brand: SIMPLICITY

## (undated) DEVICE — SYRINGE 50ML E/T

## (undated) DEVICE — SYRINGE MED 5ML STD CLR PLAS LUERLOCK TIP N CTRL DISP

## (undated) DEVICE — SYR ASSEMB INFL BLLN 60ML --

## (undated) DEVICE — SET ADMIN 16ML TBNG L100IN 2 Y INJ SITE IV PIGGY BK DISP (ORDER IN MULIPLES OF 48)

## (undated) DEVICE — BLUNTFILL: Brand: MONOJECT

## (undated) DEVICE — SYRINGE MEDICAL 3ML CLEAR PLASTIC STANDARD NON CONTROL LUERLOCK TIP DISPOSABLE

## (undated) DEVICE — BLUNTFILL WITH FILTER: Brand: MONOJECT

## (undated) DEVICE — ADULT SPO2 SENSOR: Brand: NELLCOR

## (undated) DEVICE — KENDALL RADIOLUCENT FOAM MONITORING ELECTRODE -RECTANGULAR SHAPE: Brand: KENDALL

## (undated) DEVICE — IV STRT KT 3282] LSL INDUSTRIES INC]